# Patient Record
Sex: MALE | Race: WHITE | Employment: FULL TIME | URBAN - METROPOLITAN AREA
[De-identification: names, ages, dates, MRNs, and addresses within clinical notes are randomized per-mention and may not be internally consistent; named-entity substitution may affect disease eponyms.]

---

## 2021-09-01 ENCOUNTER — EKG ENCOUNTER (OUTPATIENT)
Dept: LAB | Age: 36
End: 2021-09-01
Attending: FAMILY MEDICINE
Payer: COMMERCIAL

## 2021-09-01 ENCOUNTER — OFFICE VISIT (OUTPATIENT)
Dept: FAMILY MEDICINE CLINIC | Facility: CLINIC | Age: 36
End: 2021-09-01
Payer: COMMERCIAL

## 2021-09-01 ENCOUNTER — LAB ENCOUNTER (OUTPATIENT)
Dept: LAB | Age: 36
End: 2021-09-01
Attending: FAMILY MEDICINE
Payer: COMMERCIAL

## 2021-09-01 VITALS
HEART RATE: 77 BPM | RESPIRATION RATE: 18 BRPM | HEIGHT: 67 IN | BODY MASS INDEX: 45.1 KG/M2 | WEIGHT: 287.38 LBS | SYSTOLIC BLOOD PRESSURE: 111 MMHG | DIASTOLIC BLOOD PRESSURE: 75 MMHG

## 2021-09-01 DIAGNOSIS — Z00.00 PHYSICAL EXAM: Primary | ICD-10-CM

## 2021-09-01 DIAGNOSIS — Z00.00 PHYSICAL EXAM: ICD-10-CM

## 2021-09-01 DIAGNOSIS — E66.01 CLASS 3 SEVERE OBESITY DUE TO EXCESS CALORIES WITHOUT SERIOUS COMORBIDITY WITH BODY MASS INDEX (BMI) OF 45.0 TO 49.9 IN ADULT (HCC): ICD-10-CM

## 2021-09-01 DIAGNOSIS — F17.200 VAPING NICOTINE DEPENDENCE, NON-TOBACCO PRODUCT: ICD-10-CM

## 2021-09-01 LAB
ALBUMIN SERPL-MCNC: 3.7 G/DL (ref 3.4–5)
ALBUMIN/GLOB SERPL: 0.9 {RATIO} (ref 1–2)
ALP LIVER SERPL-CCNC: 75 U/L
ALT SERPL-CCNC: 60 U/L
ANION GAP SERPL CALC-SCNC: 7 MMOL/L (ref 0–18)
AST SERPL-CCNC: 30 U/L (ref 15–37)
BASOPHILS # BLD AUTO: 0.03 X10(3) UL (ref 0–0.2)
BASOPHILS NFR BLD AUTO: 0.4 %
BILIRUB SERPL-MCNC: 0.3 MG/DL (ref 0.1–2)
BILIRUB UR QL: NEGATIVE
BUN BLD-MCNC: 11 MG/DL (ref 7–18)
BUN/CREAT SERPL: 10.9 (ref 10–20)
CALCIUM BLD-MCNC: 9.2 MG/DL (ref 8.5–10.1)
CHLORIDE SERPL-SCNC: 102 MMOL/L (ref 98–112)
CHOLEST SMN-MCNC: 205 MG/DL (ref ?–200)
CLARITY UR: CLEAR
CO2 SERPL-SCNC: 29 MMOL/L (ref 21–32)
COLOR UR: YELLOW
CREAT BLD-MCNC: 1.01 MG/DL
DEPRECATED RDW RBC AUTO: 41.2 FL (ref 35.1–46.3)
EOSINOPHIL # BLD AUTO: 0.07 X10(3) UL (ref 0–0.7)
EOSINOPHIL NFR BLD AUTO: 0.9 %
ERYTHROCYTE [DISTWIDTH] IN BLOOD BY AUTOMATED COUNT: 13.2 % (ref 11–15)
EST. AVERAGE GLUCOSE BLD GHB EST-MCNC: 114 MG/DL (ref 68–126)
GLOBULIN PLAS-MCNC: 4.2 G/DL (ref 2.8–4.4)
GLUCOSE BLD-MCNC: 85 MG/DL (ref 70–99)
GLUCOSE UR-MCNC: NEGATIVE MG/DL
HBA1C MFR BLD HPLC: 5.6 % (ref ?–5.7)
HCT VFR BLD AUTO: 43.2 %
HDLC SERPL-MCNC: 47 MG/DL (ref 40–59)
HGB BLD-MCNC: 14.2 G/DL
HGB UR QL STRIP.AUTO: NEGATIVE
IMM GRANULOCYTES # BLD AUTO: 0.02 X10(3) UL (ref 0–1)
IMM GRANULOCYTES NFR BLD: 0.3 %
KETONES UR-MCNC: NEGATIVE MG/DL
LDLC SERPL CALC-MCNC: 141 MG/DL (ref ?–100)
LEUKOCYTE ESTERASE UR QL STRIP.AUTO: NEGATIVE
LYMPHOCYTES # BLD AUTO: 1.95 X10(3) UL (ref 1–4)
LYMPHOCYTES NFR BLD AUTO: 26.2 %
M PROTEIN MFR SERPL ELPH: 7.9 G/DL (ref 6.4–8.2)
MCH RBC QN AUTO: 28.5 PG (ref 26–34)
MCHC RBC AUTO-ENTMCNC: 32.9 G/DL (ref 31–37)
MCV RBC AUTO: 86.7 FL
MONOCYTES # BLD AUTO: 0.73 X10(3) UL (ref 0.1–1)
MONOCYTES NFR BLD AUTO: 9.8 %
NEUTROPHILS # BLD AUTO: 4.64 X10 (3) UL (ref 1.5–7.7)
NEUTROPHILS # BLD AUTO: 4.64 X10(3) UL (ref 1.5–7.7)
NEUTROPHILS NFR BLD AUTO: 62.4 %
NITRITE UR QL STRIP.AUTO: NEGATIVE
NONHDLC SERPL-MCNC: 158 MG/DL (ref ?–130)
OSMOLALITY SERPL CALC.SUM OF ELEC: 285 MOSM/KG (ref 275–295)
PATIENT FASTING Y/N/NP: YES
PATIENT FASTING Y/N/NP: YES
PH UR: 7 [PH] (ref 5–8)
PLATELET # BLD AUTO: 237 10(3)UL (ref 150–450)
POTASSIUM SERPL-SCNC: 3.9 MMOL/L (ref 3.5–5.1)
PROT UR-MCNC: NEGATIVE MG/DL
RBC # BLD AUTO: 4.98 X10(6)UL
SODIUM SERPL-SCNC: 138 MMOL/L (ref 136–145)
SP GR UR STRIP: 1.02 (ref 1–1.03)
TRIGL SERPL-MCNC: 94 MG/DL (ref 30–149)
TSI SER-ACNC: 1.18 MIU/ML (ref 0.36–3.74)
UROBILINOGEN UR STRIP-ACNC: <2
VIT D+METAB SERPL-MCNC: 23.9 NG/ML (ref 30–100)
VLDLC SERPL CALC-MCNC: 17 MG/DL (ref 0–30)
WBC # BLD AUTO: 7.4 X10(3) UL (ref 4–11)

## 2021-09-01 PROCEDURE — 81001 URINALYSIS AUTO W/SCOPE: CPT | Performed by: FAMILY MEDICINE

## 2021-09-01 PROCEDURE — 90715 TDAP VACCINE 7 YRS/> IM: CPT | Performed by: FAMILY MEDICINE

## 2021-09-01 PROCEDURE — 99385 PREV VISIT NEW AGE 18-39: CPT | Performed by: FAMILY MEDICINE

## 2021-09-01 PROCEDURE — 93010 ELECTROCARDIOGRAM REPORT: CPT | Performed by: FAMILY MEDICINE

## 2021-09-01 PROCEDURE — 90471 IMMUNIZATION ADMIN: CPT | Performed by: FAMILY MEDICINE

## 2021-09-01 PROCEDURE — 3008F BODY MASS INDEX DOCD: CPT | Performed by: FAMILY MEDICINE

## 2021-09-01 PROCEDURE — 81015 MICROSCOPIC EXAM OF URINE: CPT | Performed by: FAMILY MEDICINE

## 2021-09-01 PROCEDURE — 3078F DIAST BP <80 MM HG: CPT | Performed by: FAMILY MEDICINE

## 2021-09-01 PROCEDURE — 85025 COMPLETE CBC W/AUTO DIFF WBC: CPT

## 2021-09-01 PROCEDURE — 93005 ELECTROCARDIOGRAM TRACING: CPT

## 2021-09-01 PROCEDURE — 3074F SYST BP LT 130 MM HG: CPT | Performed by: FAMILY MEDICINE

## 2021-09-01 PROCEDURE — 84443 ASSAY THYROID STIM HORMONE: CPT

## 2021-09-01 PROCEDURE — 82306 VITAMIN D 25 HYDROXY: CPT | Performed by: FAMILY MEDICINE

## 2021-09-01 PROCEDURE — 80053 COMPREHEN METABOLIC PANEL: CPT

## 2021-09-01 PROCEDURE — 80061 LIPID PANEL: CPT

## 2021-09-01 PROCEDURE — 36415 COLL VENOUS BLD VENIPUNCTURE: CPT

## 2021-09-01 PROCEDURE — 83036 HEMOGLOBIN GLYCOSYLATED A1C: CPT

## 2021-09-01 RX ORDER — ERGOCALCIFEROL 1.25 MG/1
50000 CAPSULE ORAL WEEKLY
Qty: 12 CAPSULE | Refills: 4 | Status: SHIPPED | OUTPATIENT
Start: 2021-09-01 | End: 2021-10-01

## 2021-09-01 NOTE — PROGRESS NOTES
9/1/2021  8:51 AM    Shyam Bray is a 43year old male. Chief complaint(s): Patient presents with:  Routine Physical: Px.     HPI:     Shyam Bray primary complaint is regarding CPE.      Shyam Bray is a 43year old male is here for routine per vomiting. Endocrine: Negative for polydipsia and polyuria. Genitourinary: Negative for decreased urine volume, frequency and hematuria. No nocturia   Musculoskeletal: Negative for arthralgias. Skin: Negative for rash.    Neurological: Negative is alert. Deep Tendon Reflexes:      Reflex Scores:       Patellar reflexes are 2+ on the right side and 2+ on the left side.   Psychiatric:         Attention and Perception: Attention normal.         Mood and Affect: Mood and affect normal.         LA active; attempt to keep a schedule that includes an adequate sleep and physical exercise / activities Patient educated on doing regular self testicular exam. Patient was educated on sexual transmitted disease.  Best to abstain from sexual intercourse until

## 2021-11-11 ENCOUNTER — TELEMEDICINE (OUTPATIENT)
Dept: TELEHEALTH | Age: 36
End: 2021-11-11

## 2021-11-11 VITALS — HEIGHT: 67 IN | WEIGHT: 287 LBS | BODY MASS INDEX: 45.04 KG/M2

## 2021-11-11 DIAGNOSIS — J06.9 UPPER RESPIRATORY TRACT INFECTION, UNSPECIFIED TYPE: Primary | ICD-10-CM

## 2021-11-11 PROCEDURE — 3008F BODY MASS INDEX DOCD: CPT | Performed by: NURSE PRACTITIONER

## 2021-11-11 PROCEDURE — 99202 OFFICE O/P NEW SF 15 MIN: CPT | Performed by: NURSE PRACTITIONER

## 2021-11-11 RX ORDER — ERGOCALCIFEROL 1.25 MG/1
CAPSULE ORAL
COMMUNITY

## 2021-11-11 NOTE — PROGRESS NOTES
Virtual/Telephone Check-In    Baldev Puente verbally consents to a Virtual/Telephone Check-In service on 11/11/21. Patient has been referred to the [facility] website at [email or web address] to review the yearly Consent to Treat document.   Patient Lynn Dasilva lymphadenopathy  NEURO: see hpi      EXAM:   Ht 5' 7\" (1.702 m)   Wt 287 lb (130.2 kg)   BMI 44.95 kg/m²   GENERAL: well developed, well nourished,in no apparent distress  SKIN: no rashes,  HEAD: atraumatic, normocephalic.  No sinus pain with patient palpi illness (URI), which is another term for the common cold. This illness is contagious during the first few days. It is spread through the air by coughing and sneezing.  It may also be spread by direct contact (touching the sick person and then touching your your healthcare provider, or as advised.   When to seek medical advice  Call your healthcare provider right away if any of these occur:  · Cough with lots of colored sputum (mucus)  · Severe headache; face, neck, or ear pain  · Difficulty swallowing due to consent form, related consents and the risks discussed. Lastly, the patient confirmed that they were in [place]. Included in this visit, time may have been spent reviewing labs, medications, radiology tests and decision making.  Appropriate medical de

## 2023-10-20 ENCOUNTER — TELEMEDICINE (OUTPATIENT)
Dept: TELEHEALTH | Age: 38
End: 2023-10-20
Payer: COMMERCIAL

## 2023-10-20 ENCOUNTER — HOSPITAL ENCOUNTER (OUTPATIENT)
Age: 38
Discharge: HOME | End: 2023-10-20
Payer: COMMERCIAL

## 2023-10-20 VITALS
RESPIRATION RATE: 18 BRPM | DIASTOLIC BLOOD PRESSURE: 70 MMHG | HEART RATE: 97 BPM | TEMPERATURE: 99 F | SYSTOLIC BLOOD PRESSURE: 123 MMHG | OXYGEN SATURATION: 98 %

## 2023-10-20 DIAGNOSIS — J06.9 VIRAL UPPER RESPIRATORY TRACT INFECTION: Primary | ICD-10-CM

## 2023-10-20 DIAGNOSIS — R05.1 ACUTE COUGH: Primary | ICD-10-CM

## 2023-10-20 DIAGNOSIS — Z20.822 LAB TEST NEGATIVE FOR COVID-19 VIRUS: ICD-10-CM

## 2023-10-20 DIAGNOSIS — R07.89 FEELING OF CHEST TIGHTNESS: ICD-10-CM

## 2023-10-20 LAB
S PYO AG THROAT QL: NEGATIVE
SARS-COV-2 RNA RESP QL NAA+PROBE: NOT DETECTED

## 2023-10-20 PROCEDURE — 99214 OFFICE O/P EST MOD 30 MIN: CPT | Performed by: NURSE PRACTITIONER

## 2023-10-20 RX ORDER — BENZONATATE 100 MG/1
100 CAPSULE ORAL 3 TIMES DAILY PRN
Qty: 20 CAPSULE | Refills: 0 | Status: SHIPPED | OUTPATIENT
Start: 2023-10-20

## 2023-10-20 RX ORDER — CETIRIZINE HYDROCHLORIDE 10 MG/1
10 TABLET ORAL DAILY
Qty: 30 TABLET | Refills: 0 | Status: SHIPPED | OUTPATIENT
Start: 2023-10-20

## 2023-10-20 RX ORDER — FLUTICASONE PROPIONATE 50 MCG
2 SPRAY, SUSPENSION (ML) NASAL DAILY
Qty: 16 G | Refills: 0 | Status: SHIPPED | OUTPATIENT
Start: 2023-10-20

## 2023-10-20 NOTE — PROGRESS NOTES
Virtual/Telephone Check-In    Fredy Patricia verbally consents to a Virtual/Telephone Check-In service on 10/20/23. Patient has been referred to the [facility] website at [email or web address] to review the yearly Consent to Treat document. Patient understands and accepts financial responsibility for any deductible, co-insurance and/or co-pays associated with this service. Telehealth Verbal Consent   I conducted a telehealth visit with Fredy Patricia today, 10/20/23, which was completed using two-way, real-time interactive audio and video communication. This has been done in good carmen to provide continuity of care in the best interest of the provider-patient relationship. Every conscious effort was taken to allow for sufficient and adequate time to complete the visit. The patient was made aware of the limitations of the telehealth visit, including treatment limitations as physical exam through video visit is limited. The patient was advised to call 911 or to go to the ER in case there was an emergency. The patient was also advised of the potential privacy & security concerns related to the telehealth platform. The patient was made aware of where to find [facility] notice of privacy practices, telehealth consent form and other related consent forms and documents. which are located on the [facility] website. The patient verbally agreed to telehealth consent form, related consents and the risks discussed. Lastly, the patient confirmed that they were in [place]. Included in this visit, time may have been spent reviewing labs, medications, radiology tests and decision making. Appropriate medical decision-making and tests are ordered as detailed in the plan of care above. Coding/billing information is submitted for this visit based on complexity of care and/or time spent for the visit.     CHIEF COMPLAINT:   Patient presents with:  Upper Respiratory Infection      HPI:   Fredy Patricia is a 34year old male who presents for a video visit. Patient reports ill symptoms starting 2 days ago. Reports itchy throat, hoarse voice, runny nose, cough, body aches, ear pressure/clogged(left at first, now right). Has felt feverish today but has not checked temperature. Also reports feeling tight pressure/heaviness in the lungs, slightly short of breath with activity. Denies hx of asthma but states used family members inhaler which helped somewhat. Patient has tried theraflu tea, otc cough and cold medication for symptoms, which has somewhat seemed to help. Current Outpatient Medications   Medication Sig Dispense Refill    ergocalciferol 1.25 MG (48415 UT) Oral Cap Take by mouth every 7 days. No past medical history on file. No past surgical history on file. Social History     Socioeconomic History    Marital status:    Tobacco Use    Smoking status: Former    Smokeless tobacco: Never   Vaping Use    Vaping Use: Some days    Substances: Nicotine   Substance and Sexual Activity    Alcohol use: Yes     Comment: Socially    Drug use: Never         REVIEW OF SYSTEMS:   GENERAL: decreased appetite. + body aches  SKIN: no rashes or abnormal skin lesions  HEENT: See HPI  LUNGS:  See HPI  CARDIOVASCULAR: denies chest pain or palpitations   GI: denies N/V/C or abdominal pain  NEURO: Denies headaches    EXAM:   General: Alert, Ill-appearing/sounding, and In no acute distress  Respiratory:   Speaking in full sentences comfortably  Normal work of breathing  Coughing during visit   Head: Normocephalic  Nose: No obvious nasal discharge. Skin: No obvious rashes or lesions from what observed. No results found for this or any previous visit (from the past 24 hour(s)).     ASSESSMENT AND PLAN:   Ramses Awan is a 34year old male who presents with symptoms that are consistent with    ASSESSMENT:   Acute cough  (primary encounter diagnosis)  Feeling of chest tightness    PLAN: Discussed possible viral etiology such as COVID or influenza however due to reported chest tightness, possible wheezing, recommended in person evaluation for more thorough examination. Patient states will go to [facility] care. Spoke with Keerthi Hammond to give report.      Meds & Refills for this Visit:  Requested Prescriptions      No prescriptions requested or ordered in this encounter

## 2023-10-20 NOTE — DISCHARGE INSTRUCTIONS
Start the medications as prescribed to help with symptoms. Take over-the-counter ibuprofen or Tylenol for pain. Follow-up with your primary care provider 2 to 3 days. If you develop high fever chest pain shortness of breath nausea vomiting diarrhea or productive cough or any new or worsening symptoms go to the nearest emergency department.

## 2024-02-15 ENCOUNTER — APPOINTMENT (OUTPATIENT)
Dept: CT IMAGING | Facility: HOSPITAL | Age: 39
End: 2024-02-15
Attending: STUDENT IN AN ORGANIZED HEALTH CARE EDUCATION/TRAINING PROGRAM

## 2024-02-15 ENCOUNTER — HOSPITAL ENCOUNTER (INPATIENT)
Facility: HOSPITAL | Age: 39
LOS: 2 days | Discharge: HOME | End: 2024-02-17
Attending: STUDENT IN AN ORGANIZED HEALTH CARE EDUCATION/TRAINING PROGRAM | Admitting: HOSPITALIST

## 2024-02-15 DIAGNOSIS — K29.80 DUODENITIS: Primary | ICD-10-CM

## 2024-02-15 LAB
ALBUMIN SERPL-MCNC: 4.3 G/DL (ref 3.2–4.8)
ALP LIVER SERPL-CCNC: 75 U/L
ALT SERPL-CCNC: 45 U/L
ANION GAP SERPL CALC-SCNC: 4 MMOL/L (ref 0–18)
AST SERPL-CCNC: 28 U/L (ref ?–34)
BASOPHILS # BLD AUTO: 0.03 X10(3) UL (ref 0–0.2)
BASOPHILS NFR BLD AUTO: 0.2 %
BILIRUB DIRECT SERPL-MCNC: 0.2 MG/DL (ref ?–0.3)
BILIRUB SERPL-MCNC: 0.6 MG/DL (ref 0.3–1.2)
BILIRUB UR QL: NEGATIVE
BUN BLD-MCNC: 7 MG/DL (ref 9–23)
BUN/CREAT SERPL: 7.9 (ref 10–20)
CALCIUM BLD-MCNC: 9.2 MG/DL (ref 8.7–10.4)
CHLORIDE SERPL-SCNC: 103 MMOL/L (ref 98–112)
CLARITY UR: CLEAR
CO2 SERPL-SCNC: 28 MMOL/L (ref 21–32)
CREAT BLD-MCNC: 0.89 MG/DL
DEPRECATED RDW RBC AUTO: 40.7 FL (ref 35.1–46.3)
EGFRCR SERPLBLD CKD-EPI 2021: 111 ML/MIN/1.73M2 (ref 60–?)
EOSINOPHIL # BLD AUTO: 0.04 X10(3) UL (ref 0–0.7)
EOSINOPHIL NFR BLD AUTO: 0.3 %
ERYTHROCYTE [DISTWIDTH] IN BLOOD BY AUTOMATED COUNT: 13.3 % (ref 11–15)
GLUCOSE BLD-MCNC: 97 MG/DL (ref 70–99)
GLUCOSE UR-MCNC: NORMAL MG/DL
HCT VFR BLD AUTO: 43.2 %
HGB BLD-MCNC: 14.4 G/DL
HGB UR QL STRIP.AUTO: NEGATIVE
IMM GRANULOCYTES # BLD AUTO: 0.05 X10(3) UL (ref 0–1)
IMM GRANULOCYTES NFR BLD: 0.3 %
KETONES UR-MCNC: NEGATIVE MG/DL
LEUKOCYTE ESTERASE UR QL STRIP.AUTO: NEGATIVE
LIPASE SERPL-CCNC: 35 U/L (ref 13–75)
LYMPHOCYTES # BLD AUTO: 1.8 X10(3) UL (ref 1–4)
LYMPHOCYTES NFR BLD AUTO: 12 %
MCH RBC QN AUTO: 27.7 PG (ref 26–34)
MCHC RBC AUTO-ENTMCNC: 33.3 G/DL (ref 31–37)
MCV RBC AUTO: 83.2 FL
MONOCYTES # BLD AUTO: 1.27 X10(3) UL (ref 0.1–1)
MONOCYTES NFR BLD AUTO: 8.5 %
NEUTROPHILS # BLD AUTO: 11.79 X10 (3) UL (ref 1.5–7.7)
NEUTROPHILS # BLD AUTO: 11.79 X10(3) UL (ref 1.5–7.7)
NEUTROPHILS NFR BLD AUTO: 78.7 %
NITRITE UR QL STRIP.AUTO: NEGATIVE
OSMOLALITY SERPL CALC.SUM OF ELEC: 278 MOSM/KG (ref 275–295)
PH UR: 6.5 [PH] (ref 5–8)
PLATELET # BLD AUTO: 251 10(3)UL (ref 150–450)
POTASSIUM SERPL-SCNC: 3.8 MMOL/L (ref 3.5–5.1)
PROT SERPL-MCNC: 7.7 G/DL (ref 5.7–8.2)
PROT UR-MCNC: NEGATIVE MG/DL
RBC # BLD AUTO: 5.19 X10(6)UL
SODIUM SERPL-SCNC: 135 MMOL/L (ref 136–145)
SP GR UR STRIP: 1.01 (ref 1–1.03)
UROBILINOGEN UR STRIP-ACNC: NORMAL
WBC # BLD AUTO: 15 X10(3) UL (ref 4–11)

## 2024-02-15 PROCEDURE — 99222 1ST HOSP IP/OBS MODERATE 55: CPT | Performed by: HOSPITALIST

## 2024-02-15 PROCEDURE — 74177 CT ABD & PELVIS W/CONTRAST: CPT | Performed by: STUDENT IN AN ORGANIZED HEALTH CARE EDUCATION/TRAINING PROGRAM

## 2024-02-15 RX ORDER — ONDANSETRON 2 MG/ML
4 INJECTION INTRAMUSCULAR; INTRAVENOUS ONCE
Status: COMPLETED | OUTPATIENT
Start: 2024-02-15 | End: 2024-02-15

## 2024-02-15 RX ORDER — MORPHINE SULFATE 4 MG/ML
4 INJECTION, SOLUTION INTRAMUSCULAR; INTRAVENOUS ONCE
Status: COMPLETED | OUTPATIENT
Start: 2024-02-15 | End: 2024-02-15

## 2024-02-15 RX ORDER — MORPHINE SULFATE 2 MG/ML
1 INJECTION, SOLUTION INTRAMUSCULAR; INTRAVENOUS EVERY 2 HOUR PRN
Status: DISCONTINUED | OUTPATIENT
Start: 2024-02-15 | End: 2024-02-17

## 2024-02-15 RX ORDER — FAMOTIDINE 10 MG/ML
20 INJECTION, SOLUTION INTRAVENOUS ONCE
Status: COMPLETED | OUTPATIENT
Start: 2024-02-15 | End: 2024-02-15

## 2024-02-15 RX ORDER — DEXTROSE AND SODIUM CHLORIDE 5; .9 G/100ML; G/100ML
INJECTION, SOLUTION INTRAVENOUS CONTINUOUS
Status: DISCONTINUED | OUTPATIENT
Start: 2024-02-15 | End: 2024-02-17

## 2024-02-15 RX ORDER — PROCHLORPERAZINE EDISYLATE 5 MG/ML
5 INJECTION INTRAMUSCULAR; INTRAVENOUS EVERY 8 HOURS PRN
Status: DISCONTINUED | OUTPATIENT
Start: 2024-02-15 | End: 2024-02-17

## 2024-02-15 RX ORDER — MORPHINE SULFATE 2 MG/ML
2 INJECTION, SOLUTION INTRAMUSCULAR; INTRAVENOUS EVERY 2 HOUR PRN
Status: DISCONTINUED | OUTPATIENT
Start: 2024-02-15 | End: 2024-02-17

## 2024-02-15 RX ORDER — ONDANSETRON 2 MG/ML
4 INJECTION INTRAMUSCULAR; INTRAVENOUS EVERY 6 HOURS PRN
Status: DISCONTINUED | OUTPATIENT
Start: 2024-02-15 | End: 2024-02-17

## 2024-02-15 RX ORDER — METOCLOPRAMIDE HYDROCHLORIDE 5 MG/ML
10 INJECTION INTRAMUSCULAR; INTRAVENOUS ONCE
Status: COMPLETED | OUTPATIENT
Start: 2024-02-15 | End: 2024-02-15

## 2024-02-15 RX ORDER — ACETAMINOPHEN 500 MG
500 TABLET ORAL EVERY 4 HOURS PRN
Status: DISCONTINUED | OUTPATIENT
Start: 2024-02-15 | End: 2024-02-17

## 2024-02-15 RX ORDER — TEMAZEPAM 15 MG/1
15 CAPSULE ORAL NIGHTLY PRN
Status: DISCONTINUED | OUTPATIENT
Start: 2024-02-15 | End: 2024-02-17

## 2024-02-15 RX ORDER — MORPHINE SULFATE 4 MG/ML
4 INJECTION, SOLUTION INTRAMUSCULAR; INTRAVENOUS EVERY 2 HOUR PRN
Status: DISCONTINUED | OUTPATIENT
Start: 2024-02-15 | End: 2024-02-17

## 2024-02-15 NOTE — ED INITIAL ASSESSMENT (HPI)
Pt to ED with c/o atraumatic generalized abdominal pain for about 3 days. Pt denies bloody or black stools. Denies diarrhea. +constipation. Denies dysuria or hematuria. Pt states intermittent nausea and vomiting. Denies cough. No noted fever at home. Pt is alert and oriented x4. Pt skin warm and dry. Pt ambulating by self with steady gait.

## 2024-02-16 ENCOUNTER — ANESTHESIA (OUTPATIENT)
Dept: ENDOSCOPY | Facility: HOSPITAL | Age: 39
End: 2024-02-16

## 2024-02-16 ENCOUNTER — ANESTHESIA EVENT (OUTPATIENT)
Dept: ENDOSCOPY | Facility: HOSPITAL | Age: 39
End: 2024-02-16

## 2024-02-16 LAB
ANION GAP SERPL CALC-SCNC: 4 MMOL/L (ref 0–18)
BASOPHILS # BLD AUTO: 0.03 X10(3) UL (ref 0–0.2)
BASOPHILS NFR BLD AUTO: 0.2 %
BUN BLD-MCNC: 6 MG/DL (ref 9–23)
BUN/CREAT SERPL: 7.3 (ref 10–20)
CALCIUM BLD-MCNC: 8.5 MG/DL (ref 8.7–10.4)
CHLORIDE SERPL-SCNC: 105 MMOL/L (ref 98–112)
CO2 SERPL-SCNC: 29 MMOL/L (ref 21–32)
CREAT BLD-MCNC: 0.82 MG/DL
DEPRECATED RDW RBC AUTO: 42.2 FL (ref 35.1–46.3)
EGFRCR SERPLBLD CKD-EPI 2021: 114 ML/MIN/1.73M2 (ref 60–?)
EOSINOPHIL # BLD AUTO: 0.06 X10(3) UL (ref 0–0.7)
EOSINOPHIL NFR BLD AUTO: 0.4 %
ERYTHROCYTE [DISTWIDTH] IN BLOOD BY AUTOMATED COUNT: 13.6 % (ref 11–15)
GLUCOSE BLD-MCNC: 113 MG/DL (ref 70–99)
HCT VFR BLD AUTO: 39.3 %
HGB BLD-MCNC: 12.8 G/DL
IMM GRANULOCYTES # BLD AUTO: 0.04 X10(3) UL (ref 0–1)
IMM GRANULOCYTES NFR BLD: 0.3 %
LYMPHOCYTES # BLD AUTO: 1.85 X10(3) UL (ref 1–4)
LYMPHOCYTES NFR BLD AUTO: 13.3 %
MCH RBC QN AUTO: 27.7 PG (ref 26–34)
MCHC RBC AUTO-ENTMCNC: 32.6 G/DL (ref 31–37)
MCV RBC AUTO: 85.1 FL
MONOCYTES # BLD AUTO: 1.85 X10(3) UL (ref 0.1–1)
MONOCYTES NFR BLD AUTO: 13.3 %
NEUTROPHILS # BLD AUTO: 10.13 X10 (3) UL (ref 1.5–7.7)
NEUTROPHILS # BLD AUTO: 10.13 X10(3) UL (ref 1.5–7.7)
NEUTROPHILS NFR BLD AUTO: 72.5 %
OSMOLALITY SERPL CALC.SUM OF ELEC: 284 MOSM/KG (ref 275–295)
PLATELET # BLD AUTO: 213 10(3)UL (ref 150–450)
POTASSIUM SERPL-SCNC: 3.8 MMOL/L (ref 3.5–5.1)
RBC # BLD AUTO: 4.62 X10(6)UL
SODIUM SERPL-SCNC: 138 MMOL/L (ref 136–145)
TRIGL SERPL-MCNC: 86 MG/DL (ref 30–149)
WBC # BLD AUTO: 14 X10(3) UL (ref 4–11)

## 2024-02-16 PROCEDURE — 99223 1ST HOSP IP/OBS HIGH 75: CPT | Performed by: INTERNAL MEDICINE

## 2024-02-16 PROCEDURE — 99233 SBSQ HOSP IP/OBS HIGH 50: CPT | Performed by: HOSPITALIST

## 2024-02-16 PROCEDURE — 43239 EGD BIOPSY SINGLE/MULTIPLE: CPT | Performed by: INTERNAL MEDICINE

## 2024-02-16 PROCEDURE — 0DB68ZX EXCISION OF STOMACH, VIA NATURAL OR ARTIFICIAL OPENING ENDOSCOPIC, DIAGNOSTIC: ICD-10-PCS | Performed by: INTERNAL MEDICINE

## 2024-02-16 RX ORDER — HYDROCODONE BITARTRATE AND ACETAMINOPHEN 5; 325 MG/1; MG/1
1 TABLET ORAL EVERY 6 HOURS PRN
Status: DISCONTINUED | OUTPATIENT
Start: 2024-02-16 | End: 2024-02-17

## 2024-02-16 RX ORDER — HYDROMORPHONE HYDROCHLORIDE 1 MG/ML
0.6 INJECTION, SOLUTION INTRAMUSCULAR; INTRAVENOUS; SUBCUTANEOUS EVERY 5 MIN PRN
Status: DISCONTINUED | OUTPATIENT
Start: 2024-02-16 | End: 2024-02-16 | Stop reason: HOSPADM

## 2024-02-16 RX ORDER — NALOXONE HYDROCHLORIDE 0.4 MG/ML
80 INJECTION, SOLUTION INTRAMUSCULAR; INTRAVENOUS; SUBCUTANEOUS AS NEEDED
Status: DISCONTINUED | OUTPATIENT
Start: 2024-02-16 | End: 2024-02-16 | Stop reason: HOSPADM

## 2024-02-16 RX ORDER — SODIUM CHLORIDE, SODIUM LACTATE, POTASSIUM CHLORIDE, CALCIUM CHLORIDE 600; 310; 30; 20 MG/100ML; MG/100ML; MG/100ML; MG/100ML
INJECTION, SOLUTION INTRAVENOUS CONTINUOUS PRN
Status: DISCONTINUED | OUTPATIENT
Start: 2024-02-16 | End: 2024-02-16 | Stop reason: SURG

## 2024-02-16 RX ORDER — HYDROMORPHONE HYDROCHLORIDE 1 MG/ML
0.2 INJECTION, SOLUTION INTRAMUSCULAR; INTRAVENOUS; SUBCUTANEOUS EVERY 5 MIN PRN
Status: DISCONTINUED | OUTPATIENT
Start: 2024-02-16 | End: 2024-02-16 | Stop reason: HOSPADM

## 2024-02-16 RX ORDER — SODIUM CHLORIDE, SODIUM LACTATE, POTASSIUM CHLORIDE, CALCIUM CHLORIDE 600; 310; 30; 20 MG/100ML; MG/100ML; MG/100ML; MG/100ML
INJECTION, SOLUTION INTRAVENOUS CONTINUOUS
Status: DISCONTINUED | OUTPATIENT
Start: 2024-02-16 | End: 2024-02-16

## 2024-02-16 RX ORDER — MORPHINE SULFATE 4 MG/ML
4 INJECTION, SOLUTION INTRAMUSCULAR; INTRAVENOUS EVERY 10 MIN PRN
Status: DISCONTINUED | OUTPATIENT
Start: 2024-02-16 | End: 2024-02-16 | Stop reason: HOSPADM

## 2024-02-16 RX ORDER — POLYETHYLENE GLYCOL 3350 17 G/17G
17 POWDER, FOR SOLUTION ORAL 2 TIMES DAILY
Status: DISCONTINUED | OUTPATIENT
Start: 2024-02-16 | End: 2024-02-17

## 2024-02-16 RX ORDER — MORPHINE SULFATE 10 MG/ML
6 INJECTION, SOLUTION INTRAMUSCULAR; INTRAVENOUS EVERY 10 MIN PRN
Status: DISCONTINUED | OUTPATIENT
Start: 2024-02-16 | End: 2024-02-16 | Stop reason: HOSPADM

## 2024-02-16 RX ORDER — MORPHINE SULFATE 4 MG/ML
2 INJECTION, SOLUTION INTRAMUSCULAR; INTRAVENOUS EVERY 10 MIN PRN
Status: DISCONTINUED | OUTPATIENT
Start: 2024-02-16 | End: 2024-02-16 | Stop reason: HOSPADM

## 2024-02-16 RX ORDER — HYDROMORPHONE HYDROCHLORIDE 1 MG/ML
0.4 INJECTION, SOLUTION INTRAMUSCULAR; INTRAVENOUS; SUBCUTANEOUS EVERY 5 MIN PRN
Status: DISCONTINUED | OUTPATIENT
Start: 2024-02-16 | End: 2024-02-16 | Stop reason: HOSPADM

## 2024-02-16 RX ADMIN — SODIUM CHLORIDE, SODIUM LACTATE, POTASSIUM CHLORIDE, CALCIUM CHLORIDE: 600; 310; 30; 20 INJECTION, SOLUTION INTRAVENOUS at 13:31:00

## 2024-02-16 NOTE — PLAN OF CARE
Patient is Aox4, vital signs stable, room air, independent. EGD done. Clear liquid diet, tolerating well. Patient complaints of abdominal pain. Morphine given in the morning with some relief. After the EGD patient refused pain medication. Per patient would like to see if he can tolerate pain without pain medication. Educated patient on pain medication and importance of controlling the pain. Call light within reach.     Problem: GASTROINTESTINAL - ADULT  Goal: Minimal or absence of nausea and vomiting  Description: INTERVENTIONS:  - Maintain adequate hydration with IV or PO as ordered and tolerated  - Nasogastric tube to low intermittent suction as ordered  - Evaluate effectiveness of ordered antiemetic medications  - Provide nonpharmacologic comfort measures as appropriate  - Advance diet as tolerated, if ordered  - Obtain nutritional consult as needed  - Evaluate fluid balance  Outcome: Progressing     Problem: PAIN - ADULT  Goal: Verbalizes/displays adequate comfort level or patient's stated pain goal  Description: INTERVENTIONS:  - Encourage pt to monitor pain and request assistance  - Assess pain using appropriate pain scale  - Administer analgesics based on type and severity of pain and evaluate response  - Implement non-pharmacological measures as appropriate and evaluate response  - Consider cultural and social influences on pain and pain management  - Manage/alleviate anxiety  - Utilize distraction and/or relaxation techniques  - Monitor for opioid side effects  - Notify MD/LIP if interventions unsuccessful or patient reports new pain  - Anticipate increased pain with activity and pre-medicate as appropriate  2/16/2024 1011 by Portia Berrios, RN  Outcome: Not Progressing  2/16/2024 1010 by Portia Berrios, RN  Outcome: Not Progressing

## 2024-02-16 NOTE — ANESTHESIA PREPROCEDURE EVALUATION
Anesthesia PreOp Note    HPI:     Mario Doran is a 38 year old male who presents for preoperative consultation requested by: Sydnee Bedoya MD    Date of Surgery: 2/15/2024 - 2/16/2024    Procedure(s):  ESOPHAGOGASTRODUODENOSCOPY (EGD)  Indication: epigastric pain    Relevant Problems   No relevant active problems       NPO:  Last Liquid Consumption Date: 02/16/24  Last Liquid Consumption Time: 1200  Last Solid Consumption Date: 02/15/24  Last Solid Consumption Time: 1800  Last Liquid Consumption Date: 02/16/24          History Review:  Patient Active Problem List    Diagnosis Date Noted    Duodenitis 02/15/2024       Past Medical History:   Diagnosis Date    Asthma        History reviewed. No pertinent surgical history.    Medications Prior to Admission   Medication Sig Dispense Refill Last Dose    fluticasone propionate 50 MCG/ACT Nasal Suspension 2 sprays by Nasal route daily. (Patient taking differently: 2 sprays by Nasal route daily as needed.) 16 g 0 Past Month    cetirizine 10 MG Oral Tab Take 1 tablet (10 mg total) by mouth daily. (Patient taking differently: Take 1 tablet (10 mg total) by mouth daily as needed for Allergies.) 30 tablet 0 Past Month    ergocalciferol 1.25 MG (35898 UT) Oral Cap Take by mouth every 7 days.   Past Month     Current Facility-Administered Medications Ordered in Epic   Medication Dose Route Frequency Provider Last Rate Last Admin    pantoprazole (Protonix) 40 mg in sodium chloride 0.9% PF 10 mL IV push  40 mg Intravenous Q12H Sydnee Bedoya MD        dextrose 5%-sodium chloride 0.9% infusion   Intravenous Continuous Karan Rodriguez  mL/hr at 02/16/24 0614 New Bag at 02/16/24 0614    acetaminophen (Tylenol Extra Strength) tab 500 mg  500 mg Oral Q4H PRN Kraan Rodriguez MD        morphINE PF 2 MG/ML injection 1 mg  1 mg Intravenous Q2H PRN Karan Rodriguez MD        Or    morphINE PF 2 MG/ML injection 2 mg  2 mg Intravenous Q2H PRN Karan Rodriguez MD   2 mg at  02/16/24 0822    Or    morphINE PF 4 MG/ML injection 4 mg  4 mg Intravenous Q2H PRN Karan Rodriguez MD   4 mg at 02/16/24 1029    ondansetron (Zofran) 4 MG/2ML injection 4 mg  4 mg Intravenous Q6H PRN Karan Rodriguez MD        prochlorperazine (Compazine) 10 MG/2ML injection 5 mg  5 mg Intravenous Q8H PRN Karan Rodriguez MD        temazepam (Restoril) cap 15 mg  15 mg Oral Nightly PRN Karan Rodriguez MD         No current Epic-ordered outpatient medications on file.       Allergies   Allergen Reactions    Seasonal OTHER (SEE COMMENTS)     Sneezing, watery eyes      Avocado ITCHING    Bananas ITCHING       Family History   Problem Relation Age of Onset    Asthma Mother     Asthma Maternal Grandmother      Social History     Socioeconomic History    Marital status:    Tobacco Use    Smoking status: Former    Smokeless tobacco: Never   Vaping Use    Vaping Use: Some days    Substances: Nicotine   Substance and Sexual Activity    Alcohol use: Yes     Comment: Socially    Drug use: Never       Available pre-op labs reviewed.  Lab Results   Component Value Date    WBC 14.0 (H) 02/16/2024    RBC 4.62 02/16/2024    HGB 12.8 (L) 02/16/2024    HCT 39.3 02/16/2024    MCV 85.1 02/16/2024    MCH 27.7 02/16/2024    MCHC 32.6 02/16/2024    RDW 13.6 02/16/2024    .0 02/16/2024     Lab Results   Component Value Date     02/16/2024    K 3.8 02/16/2024     02/16/2024    CO2 29.0 02/16/2024    BUN 6 (L) 02/16/2024    CREATSERUM 0.82 02/16/2024     (H) 02/16/2024    CA 8.5 (L) 02/16/2024          Vital Signs:  Body mass index is 45.61 kg/m².   height is 1.702 m (5' 7\") and weight is 132.1 kg (291 lb 3.2 oz). His oral temperature is 98.1 °F (36.7 °C). His blood pressure is 130/71 and his pulse is 86. His respiration is 22 and oxygen saturation is 96%.   Vitals:    02/15/24 2040 02/16/24 0609 02/16/24 0821 02/16/24 1328   BP: 132/74 130/74 131/77 130/71   Pulse: 78 79 83 86   Resp: 18 16 16 22   Temp:   98.6 °F (37 °C) 98.1 °F (36.7 °C)    TempSrc:  Oral Oral    SpO2: 98% 96% 96% 96%   Weight: 132.1 kg (291 lb 3.2 oz)      Height:            Anesthesia Evaluation     Patient summary reviewed and Nursing notes reviewed    Airway   Mallampati: II  TM distance: >3 FB  Neck ROM: full  Dental          Pulmonary - normal exam   (+) asthma  Cardiovascular - normal exam  Exercise tolerance: good    NYHA Classification: I    Neuro/Psych      GI/Hepatic/Renal - negative ROS     Endo/Other - negative ROS   Abdominal  - normal exam     Other findings: Bad teeth            Anesthesia Plan:   ASA:  2  Plan:   MAC  Informed Consent Plan and Risks Discussed With:  Patient  Discussed plan with:  Attending and surgeon  Provider Attestation (if preop done by other):  MAC      I have informed Mario Doran and/or legal guardian or family member of the nature of the anesthetic plan, benefits, risks including possible dental damage if relevant, major complications, and any alternative forms of anesthetic management.   All of the patient's questions were answered to the best of my ability. The patient desires the anesthetic management as planned.  PRESTON ABEL MD  2/16/2024 1:34 PM  Present on Admission:  **None**

## 2024-02-16 NOTE — ED QUICK NOTES
Orders for admission, patient is aware of plan and ready to go upstairs. Any questions, please call ED RN Vandana at extension [phone].     Patient Covid vaccination status: Fully vaccinated     COVID Test Ordered in ED: None    COVID Suspicion at Admission: N/A    Running Infusions:      Mental Status/LOC at time of transport: A&Ox4    Other pertinent information:   CIWA score: N/A   NIH score:  N/A

## 2024-02-16 NOTE — ED PROVIDER NOTES
[facility] Emergency Department Note  Patient: Mario Doran Age: 38 year old Sex: male      MRN: [id number]  : 1985    Patient Seen in: [facility] Emergency Department    History     Chief Complaint   Patient presents with    Abdomen/Flank Pain     Stated Complaint: abd pain    History obtained from: patient    37 yo M with no PMHx came in with abdominal pain x3 days.  Describes severe generalized abdominal pain has been constant over the past 3 days.  Endorses intermittent nausea and vomiting.  States his last bowel movement was 3 days ago.  Denies any constipation.  Denies any fevers.  States he has been taking over-the-counter stool softeners with no improvement of symptoms.    Review of Systems:  Review of Systems  Positive for stated complaint: abd pain. Constitutional and vital signs reviewed. All other systems reviewed and negative except as noted above.    Patient History:  History reviewed. No pertinent past medical history.    History reviewed. No pertinent surgical history.     Family History   Problem Relation Age of Onset    Asthma Mother     Asthma Maternal Grandmother        Specific Social Determinants of Health:   Social History     Socioeconomic History    Marital status:    Tobacco Use    Smoking status: Former    Smokeless tobacco: Never   Vaping Use    Vaping Use: Some days    Substances: Nicotine   Substance and Sexual Activity    Alcohol use: Yes     Comment: Socially    Drug use: Never     Social Determinants of Health     Food Insecurity: No Food Insecurity (2/15/2024)    Food Insecurity     Food Insecurity: Never true   Transportation Needs: No Transportation Needs (2/15/2024)    Transportation Needs     Lack of Transportation: No   Housing Stability: Low Risk  (2/15/2024)    Housing Stability     Housing Instability: No           PSFH elements reviewed from today and agreed except as otherwise stated in HPI.    Physical Exam     ED Triage Vitals [02/15/24 1321]   BP  129/74   Pulse 64   Resp 18   Temp 99.8 °F (37.7 °C)   Temp src Temporal   SpO2 97 %   O2 Device None (Room air)       Current:/74 (BP Location: Right arm)   Pulse 78   Temp 99 °F (37.2 °C) (Temporal)   Resp 16   Ht 170.2 cm (5' 7\")   Wt 108.9 kg   SpO2 98%   BMI 37.59 kg/m²         Physical Exam  Constitutional:       Appearance: He is well-developed.   HENT:      Head: Normocephalic and atraumatic.      Right Ear: External ear normal.      Left Ear: External ear normal.      Nose: Nose normal.   Eyes:      Conjunctiva/sclera: Conjunctivae normal.      Pupils: Pupils are equal, round, and reactive to light.   Cardiovascular:      Rate and Rhythm: Normal rate and regular rhythm.      Heart sounds: Normal heart sounds.   Pulmonary:      Effort: Pulmonary effort is normal.      Breath sounds: Normal breath sounds.   Abdominal:      General: Bowel sounds are normal.      Palpations: Abdomen is soft.      Tenderness: There is abdominal tenderness in the right upper quadrant, epigastric area and left upper quadrant.   Musculoskeletal:         General: Normal range of motion.      Cervical back: Normal range of motion and neck supple.   Skin:     General: Skin is warm and dry.      Findings: No rash.   Neurological:      General: No focal deficit present.      Mental Status: He is alert and oriented to person, place, and time.      Deep Tendon Reflexes: Reflexes are normal and symmetric.   Psychiatric:         Mood and Affect: Mood normal.         Behavior: Behavior normal.         ED Course   Labs:   Labs Reviewed   BASIC METABOLIC PANEL (8) - Abnormal; Notable for the following components:       Result Value    Sodium 135 (*)     BUN 7 (*)     BUN/CREA Ratio 7.9 (*)     All other components within normal limits   CBC W/ DIFFERENTIAL - Abnormal; Notable for the following components:    WBC 15.0 (*)     Neutrophil Absolute Prelim 11.79 (*)     Neutrophil Absolute 11.79 (*)     Monocyte Absolute 1.27 (*)      All other components within normal limits   HEPATIC FUNCTION PANEL (7) - Normal   LIPASE - Normal   CBC WITH DIFFERENTIAL WITH PLATELET    Narrative:     The following orders were created for panel order CBC With Differential With Platelet.  Procedure                               Abnormality         Status                     ---------                               -----------         ------                     CBC W/ DIFFERENTIAL[[id number]]          Abnormal            Final result                 Please view results for these tests on the individual orders.   URINALYSIS WITH CULTURE REFLEX   BASIC METABOLIC PANEL (8)   CBC WITH DIFFERENTIAL WITH PLATELET     Radiology findings:  I personally reviewed the images.   CT ABDOMEN+PELVIS(CONTRAST ONLY)(CPT=74177)    Result Date: 2/15/2024  CONCLUSION:   Inflammatory fat stranding within the medial right upper quadrant adjacent to the pancreaticoduodenal groove.  Findings may be secondary to groove pancreatitis or duodenitis of the 2nd and 3rd portion of the duodenum.  No mass or abscess.  Hepatic steatosis  Multiple other incidental findings as described in the body of the report.      Dictated by (CST): Meir Rollins MD on 2/15/2024 at 3:58 PM     Finalized by (CST): Meir Rollins MD on 2/15/2024 at 4:15 PM             Cardiac Monitor: Interpreted by me.   Pulse Readings from Last 1 Encounters:   02/15/24 78   , sinus,     External non-ED records reviewed independently by me: PCP note from 9/1/21 confirms no chronic medical conditions    MDM   37 yo M w/ no PMHx came in with generalized abd pain x3 days. On exam, has upper abdominal tenderness.     Differential diagnoses considered includes, but is not limited to: gastritis, enteritis, SBO, cholecystitis, pancreatitis, gallstones, constipation    Will obtain the following tests: CBC, BMP, hepatic function panel, lipase, UA, CT abd/pelvis  Please see ED course for my independent review of these tests/imaging  results.    Initial Medications/Therapeutics administered: IVF bolus, zofran, reglan, pepcid, morphine    Chronic conditions affecting care: None    Workup and medications considered but not ordered: None    Social Determinants of Health that impacted care: None     ED course: Labs with leukocytosis of 15.0.  Otherwise electrolytes within normal limits.  No evidence of biliary obstructive process.  Independently viewed the CT abdomen pelvis which shows no evidence of obstructive process.  Agree with radiology read above which notes duodenitis versus groove pancreatitis.  Lipase is normal making pancreatitis less likely.  Patient continues to endorse significant symptoms.  I discussed patient's case with the [facility] hospitalist who accepted the patient for admission for further gastroenterology evaluation.  Endorsed to Dr. Nelson, gastroenterology who was made aware new consult.  Request n.p.o. at midnight, Protonix, admission for possible scope tomorrow.  Patient expressed understanding and agreement with this plan.      Disposition and Plan     Clinical Impression:  1. Duodenitis        Disposition:  Admit    Follow-up:  No follow-up provider specified.    Medications Prescribed:  Current Discharge Medication List          Hospital Problems       Present on Admission  Date Reviewed: 10/20/2023            ICD-10-CM Noted POA    * (Principal) Duodenitis K29.80 2/15/2024 Unknown        This note may have been created using voice dictation technology and may include inadvertent errors.      Farrah Freeman MD  Emergency Medicine

## 2024-02-16 NOTE — H&P
[facility]    PATIENT'S NAME: CHRISTOPHER LUTZ   ATTENDING PHYSICIAN: Farrah Freeman MD   PATIENT ACCOUNT#:   [id number]    LOCATION:  [address]  MEDICAL RECORD #:   [id number]       YOB: 1985  ADMISSION DATE:       02/15/2024    HISTORY AND PHYSICAL EXAMINATION    CHIEF COMPLAINT:  Abdominal pain.    HISTORY OF PRESENT ILLNESS:  The patient is a 38-year-old  male who presented to the emergency department for evaluation of midepigastric abdominal pain associated with nausea, vomiting, poor appetite for last 3 days.  CBC showed white blood cell count of 15.0 with left shift.  Chemistry, liver function tests were unremarkable.  Lipase was normal.  CT scan of the abdomen showed findings suggestive either groove pancreatitis or duodenitis with inflammation at the second and third portions.  Patient was given IV Pepcid, oral challenge failed, and patient will be admitted to the hospital for further management.      PAST MEDICAL HISTORY:  Patient denies medical problems.      PAST SURGICAL HISTORY:  None.    MEDICATIONS:  Please see medication reconciliation list.  Patient denies any over-the-counter NSAIDs use.    ALLERGIES:  No known drug allergies.  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     FAMILY HISTORY:  Both parents are alive.  Mother had asthma.    SOCIAL HISTORY:  He smokes 1/2 pack to 1 pack a day.  No significant alcohol or drug use.      REVIEW OF SYSTEMS:  Patient reports epigastric intense pain constant for last 2 to 3 days associated with nausea, vomiting, poor oral intake.  No radiation to the back.  He felt chills but no recorded fever.  Other 12-point review of systems is negative.      PHYSICAL EXAMINATION:    GENERAL:  Alert and oriented to time, place, and person.  Moderate distress.  VITAL SIGNS:  Temperature 99.0, pulse 75, respiratory rate 16, blood pressure 118/76, pulse ox 99% on room air.    HEENT:  Atraumatic.  Oropharynx clear.  Moist mucous  membranes.  Normal hard and soft palate.  Eyes:  Anicteric sclerae.    NECK:  Supple.  No lymphadenopathy.  Trachea midline.  Full range of motion.  LUNGS:  Clear to auscultation bilaterally.  Normal respiratory effort.    HEART:  Regular rate, rhythm.  S1 and S2 auscultated.  No murmur.   ABDOMEN:  Soft, nondistended.  No tenderness.  Positive bowel sounds.  Discomfort to palpation mid abdomen.  No guarding or rebound tenderness.  EXTREMITIES:  No peripheral edema, clubbing, or cyanosis.  NEUROLOGIC:  Motor and sensory intact.    ASSESSMENT:    1.   Abdominal pain with imaging study suggestive of duodenitis.  Rule out underlying peptic ulcer disease.  2.   Low-grade fever.     PLAN:  Continue to monitor his temperature curve.  If he spikes temperature above 101, we will obtain blood cultures.  Gastroenterology consult.  NPO after midnight.  Pain and nausea control.  Further recommendations to follow.     Dictated By Karan Rodriguez MD  d: 02/15/2024 18:31:16  t: 02/15/2024 18:36:29  Job [id number]  FB/    cc: Farrah Freeman MD

## 2024-02-16 NOTE — ANESTHESIA POSTPROCEDURE EVALUATION
Patient: Mario Doran    Procedure Summary       Date: 02/16/24 Room / Location: [facility] ENDOSCOPY 01 / [facility] ENDOSCOPY    Anesthesia Start: 1340 Anesthesia Stop:     Procedure: ESOPHAGOGASTRODUODENOSCOPY (EGD) Diagnosis: (gastric erosions)    Surgeons: Sydnee Bedoya MD Anesthesiologist: Sangeeta Abel MD    Anesthesia Type: MAC ASA Status: 2            Anesthesia Type: MAC    Vitals Value Taken Time   /65 02/16/24 1400   Temp 98.2 °F (36.8 °C) 02/16/24 1400   Pulse 87 02/16/24 1400   Resp 20 02/16/24 1400   SpO2 96 % 02/16/24 1400       [facility] AN Post Evaluation:   Patient Evaluated in PACU  Patient Participation: complete - patient participated  Level of Consciousness: awake and alert  Pain Score: 0  Pain Management: adequate  Airway Patency:patent  Dental exam unchanged from preop  Yes    Nausea/Vomiting: none  Cardiovascular Status: stable  Respiratory Status: room air  Postoperative Hydration stable      SANGEETA ABEL MD  2/16/2024 2:01 PM

## 2024-02-16 NOTE — PROGRESS NOTES
[facility]    Progress Note    Mario Doran Patient Status:  Inpatient    1985 MRN [id number]   Location [facility] 1W Attending Melissa Ojeda MD   Hosp Day # 1 PCP BRAD RO MD       Subjective:   Mario Doran is resting in bed. Afebrile. Abdominal pain is controlled with the morphine.     Objective:   Blood pressure 131/77, pulse 83, temperature 98.1 °F (36.7 °C), temperature source Oral, resp. rate 16, height 5' 7\" (1.702 m), weight 291 lb 3.2 oz (132.1 kg), SpO2 96%.    Physical Exam:    General: No acute distress.   Respiratory: Clear to auscultation bilaterally. No wheezes. No rhonchi.  Cardiovascular: S1, S2. Regular rate and rhythm. No murmurs, rubs or gallops.   Abdomen: Soft, nontender, nondistended.  Positive bowel sounds. No rebound or guarding.  Neurologic: No focal neurological deficits.   Musculoskeletal: Moves all extremities.  Extremities: No edema.    Results:     Lab Results   Component Value Date    WBC 14.0 (H) 2024    HGB 12.8 (L) 2024    HCT 39.3 2024    .0 2024    CREATSERUM 0.82 2024    BUN 6 (L) 2024     2024    K 3.8 2024     2024    CO2 29.0 2024     (H) 2024    CA 8.5 (L) 2024    ALB 4.3 02/15/2024    ALKPHO 75 02/15/2024    BILT 0.6 02/15/2024    TP 7.7 02/15/2024    AST 28 02/15/2024    ALT 45 02/15/2024    TSH 1.180 2021    LIP 35 02/15/2024       CT ABDOMEN+PELVIS(CONTRAST ONLY)(CPT=74177)    Result Date: 2/15/2024  CONCLUSION:   Inflammatory fat stranding within the medial right upper quadrant adjacent to the pancreaticoduodenal groove.  Findings may be secondary to groove pancreatitis or duodenitis of the 2nd and 3rd portion of the duodenum.  No mass or abscess.  Hepatic steatosis  Multiple other incidental findings as described in the body of the report.      Dictated by (CST): Meir Rollins MD on 2/15/2024 at 3:58 PM     Finalized by  (CST): Meir Rollins MD on 2/15/2024 at 4:15 PM              pantoprazole  40 mg Intravenous Q12H         Assessment and Plan:     Duodenitis  - found on CT of the abdomen and pelvis  - GI on board  - Protonix 40 mg IV BID  - EGD today   - NPO  - IV morphine for pain  - can switch to norco once he can eat     DVT proph- SCDs    Full code    MDM High. Time spent on chart/note review, review of labs/imaging, discussion with patient, physical exam, discussion with staff, consultants, coordinating care, writing progress note, and discussion of plan of care.           LEXII BEAVERS MD  02/16/24

## 2024-02-16 NOTE — INTERVAL H&P NOTE
Pre-op Diagnosis: epigastric pain    The above referenced H&P was reviewed by Sydnee James Ma, MD on 2/16/2024, the patient was examined and no significant changes have occurred in the patient's condition since the H&P was performed.  I discussed with the patient and/or legal representative the potential benefits, risks and side effects of this procedure; the likelihood of the patient achieving goals; and potential problems that might occur during recuperation.  I discussed reasonable alternatives to the procedure, including risks, benefits and side effects related to the alternatives and risks related to not receiving this procedure.  We will proceed with procedure as planned.

## 2024-02-16 NOTE — H&P (VIEW-ONLY)
Is this a shared or split note between Advanced Practice Provider and Physician? Yes       [facility]   Gastroenterology Consultation Note    Mario Doran  Patient Status:    Inpatient  Date of Admission:         2/15/2024, Hospital day #1  Attending:   Melissa Ojeda MD  PCP:     BRAD RO MD    Reason for Consultation:  Abdominal pain    History of Present Illness:  Mario Doran is a 38 year old male w/ no pertinent medical history who presented to the ER on 2/15 for generalized abdominal pain.     Patient notes abdominal pain began 4 days ago. Began in epigastric region and has been constant. Notes can wax and wean in intensity. Often fluctuates between a 5-10/10. Worse with food and movement. Only improvement so far has been morphine in the ER. Pain can radiate to his back and to both RUQ and LUQ.     He notes daily GERD often with nocturnal symptoms. He endorses continued nausea with episodes of vomiting since the abdominal pain began. Appetite has been decreased.     Weight is stable.     CT a/p in ER demonstrated inflammatory fat stranding within the medial right upper quadrant adjacent to the pancreaticoduodenal groove. Possible groove pancreatitis or duodenitis of the 2nd or 3rd portion of the duodenum. Lipase normal. AST, ALT, alk phos and bili early normal. WBC 15. Hemoglobin 14.4.     Today WBC improved to 14 and hemoglobin now 12.8.     Pertinent Family Hx:  - No known history of esophageal, gastric or colon cancers  - No known IBD  - No known liver pathologies    Endoscopy Hx:  - none    Social Hx:  - Alcohol use- 1-2 drinks a month   - Tobacco use- None  - Denies illicit drug use  - Lives with: family      History:  History reviewed. No pertinent past medical history.  History reviewed. No pertinent surgical history.  Family History   Problem Relation Age of Onset    Asthma Mother     Asthma Maternal Grandmother       reports that he has quit smoking. He has never used  smokeless tobacco. He reports current alcohol use. He reports that he does not use drugs.    Allergies:  Allergies   Allergen Reactions    Seasonal OTHER (SEE COMMENTS)     Sneezing, watery eyes      Avocado ITCHING    Bananas ITCHING       Medications:    Current Facility-Administered Medications:     pantoprazole (Protonix) 40 mg in sodium chloride 0.9% PF 10 mL IV push, 40 mg, Intravenous, Daily    dextrose 5%-sodium chloride 0.9% infusion, , Intravenous, Continuous    acetaminophen (Tylenol Extra Strength) tab 500 mg, 500 mg, Oral, Q4H PRN    morphINE PF 2 MG/ML injection 1 mg, 1 mg, Intravenous, Q2H PRN **OR** morphINE PF 2 MG/ML injection 2 mg, 2 mg, Intravenous, Q2H PRN **OR** morphINE PF 4 MG/ML injection 4 mg, 4 mg, Intravenous, Q2H PRN    ondansetron (Zofran) 4 MG/2ML injection 4 mg, 4 mg, Intravenous, Q6H PRN    prochlorperazine (Compazine) 10 MG/2ML injection 5 mg, 5 mg, Intravenous, Q8H PRN    temazepam (Restoril) cap 15 mg, 15 mg, Oral, Nightly PRN    Review of Systems:   CONSTITUTIONAL:  negative for fevers, chills, unintentional weight loss   EYES:  negative for diplopia or change in vision   RESPIRATORY:  negative for severe shortness of breath  CARDIOVASCULAR:  negative for crushing sub-sternal chest pain  GASTROINTESTINAL:  see HPI  GENITOURINARY:  negative for dysuria or gross hematuria  INTEGUMENT/BREAST:  SKIN:  negative for jaundice or new rash   ALLERGIC/IMMUNOLOGIC:  negative for hay fever   ENDOCRINE:  negative for cold intolerance and heat intolerance  MUSCULOSKELETAL:  negative for joint effusion/severe erythema  NEURO: negative for new loss of consciousness or dizziness   BEHAVIOR/PSYCH:  negative for psychotic behavior    Physical Exam:    Blood pressure 131/77, pulse 83, temperature 98.1 °F (36.7 °C), temperature source Oral, resp. rate 16, height 5' 7\" (1.702 m), weight 291 lb 3.2 oz (132.1 kg), SpO2 96%. Body mass index is 45.61 kg/m².    Gen: awake, alert patient, NAD  HEENT: EOMI,  the sclera appears anicteric, oropharynx clear, mucus membranes appear moist  CV: RRR  Lung: no conversational dyspnea   Abdomen: generalized TTP to upper abdomen. No rebound or guarding. Normoactive bowel sounds.   Back: No CVA tenderness   Skin: dry, warm, no jaundice  Ext: no LE edema is evident  Neuro: Alert and interactive  Psych: calm, cooperative    Laboratory Data:  Lab Results   Component Value Date    WBC 14.0 02/16/2024    HGB 12.8 02/16/2024    HCT 39.3 02/16/2024    .0 02/16/2024    CREATSERUM 0.82 02/16/2024    BUN 6 02/16/2024     02/16/2024    K 3.8 02/16/2024     02/16/2024    CO2 29.0 02/16/2024     02/16/2024    CA 8.5 02/16/2024    ALB 4.3 02/15/2024    ALKPHO 75 02/15/2024    BILT 0.6 02/15/2024    TP 7.7 02/15/2024    AST 28 02/15/2024    ALT 45 02/15/2024    LIP 35 02/15/2024       Imaging:  CT ABDOMEN+PELVIS(CONTRAST ONLY)(CPT=74177)    Result Date: 2/15/2024  CONCLUSION:   Inflammatory fat stranding within the medial right upper quadrant adjacent to the pancreaticoduodenal groove.  Findings may be secondary to groove pancreatitis or duodenitis of the 2nd and 3rd portion of the duodenum.  No mass or abscess.  Hepatic steatosis  Multiple other incidental findings as described in the body of the report.      Dictated by (CST): Meir Rollins MD on 2/15/2024 at 3:58 PM     Finalized by (CST): Meir Rollins MD on 2/15/2024 at 4:15 PM           Assessment & Plan   Mario Doran is a 38 year old male w/ no pertinent medical history who presented to the ER on 2/15 for generalized abdominal pain.     Patient notes abdominal pain began 4 days ago. Began in epigastric region and has been constant. Notes can wax and wean in intensity. Often fluctuates between a 5-10/10. Worse with food and movement. Only improvement so far has been morphine in the ER. Pain can radiate to his back and to both RUQ and LUQ.     He notes daily GERD often with nocturnal symptoms. He endorses continued  nausea with episodes of vomiting since the abdominal pain began. Appetite has been decreased.     Weight is stable.     CT a/p in ER demonstrated inflammatory fat stranding within the medial right upper quadrant adjacent to the pancreaticoduodenal groove. Possible groove pancreatitis or duodenitis of the 2nd or 3rd portion of the duodenum. Lipase normal. AST, ALT, alk phos and bili early normal. WBC 15. Hemoglobin 14.4.     #Abdominal pain  Discussed with patient pain possible due to PUD, duodenitis, GERD with esophagitis vs possible pancreatitis. Advised for EGD at this time. Pancreatitis less likely with normal lipase, no cholelithiasis and lack of tobacco or alcohol use. Will obtain triglycerides at this time as well. Plan to treat with BID PPI.     EGD consent: I have discussed the risks, benefits, and alternatives to upper endoscopy/enteroscopy with the patient/primary decision maker [who demonstrated understanding], including but not limited to the risks of bleeding, infection, pain, death, as well as the risks of anesthesia and perforation all leading to prolonged hospitalization, surgical intervention, or even death. I also specifically mentioned the miss rate of upper endoscopy of 5-10% in the best of all circumstances.  The patient has agreed to sign an informed consent and elected to proceed with procedure with possible intervention [i.e. polypectomy, stent placement, etc.] as indicated.    Recommend:  - empiric PPI   - NPO  - EGD today   - pain per primary       Thank you for the opportunity to participate in the care of this patient.    Case discussed with Sydnee Bedoya MD and Portia COREAS.    Zunilda Jamil PA-C  [facility]  2/16/2024

## 2024-02-16 NOTE — CONSULTS
Is this a shared or split note between Advanced Practice Provider and Physician? Yes       [facility]   Gastroenterology Consultation Note    Mario Doran  Patient Status:    Inpatient  Date of Admission:         2/15/2024, Hospital day #1  Attending:   Melissa Ojeda MD  PCP:     BRAD RO MD    Reason for Consultation:  Abdominal pain    History of Present Illness:  Mario Doran is a 38 year old male w/ no pertinent medical history who presented to the ER on 2/15 for generalized abdominal pain.     Patient notes abdominal pain began 4 days ago. Began in epigastric region and has been constant. Notes can wax and wean in intensity. Often fluctuates between a 5-10/10. Worse with food and movement. Only improvement so far has been morphine in the ER. Pain can radiate to his back and to both RUQ and LUQ.     He notes daily GERD often with nocturnal symptoms. He endorses continued nausea with episodes of vomiting since the abdominal pain began. Appetite has been decreased.     Weight is stable.     CT a/p in ER demonstrated inflammatory fat stranding within the medial right upper quadrant adjacent to the pancreaticoduodenal groove. Possible groove pancreatitis or duodenitis of the 2nd or 3rd portion of the duodenum. Lipase normal. AST, ALT, alk phos and bili early normal. WBC 15. Hemoglobin 14.4.     Today WBC improved to 14 and hemoglobin now 12.8.     Pertinent Family Hx:  - No known history of esophageal, gastric or colon cancers  - No known IBD  - No known liver pathologies    Endoscopy Hx:  - none    Social Hx:  - Alcohol use- 1-2 drinks a month   - Tobacco use- None  - Denies illicit drug use  - Lives with: family      History:  History reviewed. No pertinent past medical history.  History reviewed. No pertinent surgical history.  Family History   Problem Relation Age of Onset    Asthma Mother     Asthma Maternal Grandmother       reports that he has quit smoking. He has never used  smokeless tobacco. He reports current alcohol use. He reports that he does not use drugs.    Allergies:  Allergies   Allergen Reactions    Seasonal OTHER (SEE COMMENTS)     Sneezing, watery eyes      Avocado ITCHING    Bananas ITCHING       Medications:    Current Facility-Administered Medications:     pantoprazole (Protonix) 40 mg in sodium chloride 0.9% PF 10 mL IV push, 40 mg, Intravenous, Daily    dextrose 5%-sodium chloride 0.9% infusion, , Intravenous, Continuous    acetaminophen (Tylenol Extra Strength) tab 500 mg, 500 mg, Oral, Q4H PRN    morphINE PF 2 MG/ML injection 1 mg, 1 mg, Intravenous, Q2H PRN **OR** morphINE PF 2 MG/ML injection 2 mg, 2 mg, Intravenous, Q2H PRN **OR** morphINE PF 4 MG/ML injection 4 mg, 4 mg, Intravenous, Q2H PRN    ondansetron (Zofran) 4 MG/2ML injection 4 mg, 4 mg, Intravenous, Q6H PRN    prochlorperazine (Compazine) 10 MG/2ML injection 5 mg, 5 mg, Intravenous, Q8H PRN    temazepam (Restoril) cap 15 mg, 15 mg, Oral, Nightly PRN    Review of Systems:   CONSTITUTIONAL:  negative for fevers, chills, unintentional weight loss   EYES:  negative for diplopia or change in vision   RESPIRATORY:  negative for severe shortness of breath  CARDIOVASCULAR:  negative for crushing sub-sternal chest pain  GASTROINTESTINAL:  see HPI  GENITOURINARY:  negative for dysuria or gross hematuria  INTEGUMENT/BREAST:  SKIN:  negative for jaundice or new rash   ALLERGIC/IMMUNOLOGIC:  negative for hay fever   ENDOCRINE:  negative for cold intolerance and heat intolerance  MUSCULOSKELETAL:  negative for joint effusion/severe erythema  NEURO: negative for new loss of consciousness or dizziness   BEHAVIOR/PSYCH:  negative for psychotic behavior    Physical Exam:    Blood pressure 131/77, pulse 83, temperature 98.1 °F (36.7 °C), temperature source Oral, resp. rate 16, height 5' 7\" (1.702 m), weight 291 lb 3.2 oz (132.1 kg), SpO2 96%. Body mass index is 45.61 kg/m².    Gen: awake, alert patient, NAD  HEENT: EOMI,  the sclera appears anicteric, oropharynx clear, mucus membranes appear moist  CV: RRR  Lung: no conversational dyspnea   Abdomen: generalized TTP to upper abdomen. No rebound or guarding. Normoactive bowel sounds.   Back: No CVA tenderness   Skin: dry, warm, no jaundice  Ext: no LE edema is evident  Neuro: Alert and interactive  Psych: calm, cooperative    Laboratory Data:  Lab Results   Component Value Date    WBC 14.0 02/16/2024    HGB 12.8 02/16/2024    HCT 39.3 02/16/2024    .0 02/16/2024    CREATSERUM 0.82 02/16/2024    BUN 6 02/16/2024     02/16/2024    K 3.8 02/16/2024     02/16/2024    CO2 29.0 02/16/2024     02/16/2024    CA 8.5 02/16/2024    ALB 4.3 02/15/2024    ALKPHO 75 02/15/2024    BILT 0.6 02/15/2024    TP 7.7 02/15/2024    AST 28 02/15/2024    ALT 45 02/15/2024    LIP 35 02/15/2024       Imaging:  CT ABDOMEN+PELVIS(CONTRAST ONLY)(CPT=74177)    Result Date: 2/15/2024  CONCLUSION:   Inflammatory fat stranding within the medial right upper quadrant adjacent to the pancreaticoduodenal groove.  Findings may be secondary to groove pancreatitis or duodenitis of the 2nd and 3rd portion of the duodenum.  No mass or abscess.  Hepatic steatosis  Multiple other incidental findings as described in the body of the report.      Dictated by (CST): Meir Rollins MD on 2/15/2024 at 3:58 PM     Finalized by (CST): Meir Rollins MD on 2/15/2024 at 4:15 PM           Assessment & Plan   Mario Doran is a 38 year old male w/ no pertinent medical history who presented to the ER on 2/15 for generalized abdominal pain.     Patient notes abdominal pain began 4 days ago. Began in epigastric region and has been constant. Notes can wax and wean in intensity. Often fluctuates between a 5-10/10. Worse with food and movement. Only improvement so far has been morphine in the ER. Pain can radiate to his back and to both RUQ and LUQ.     He notes daily GERD often with nocturnal symptoms. He endorses continued  nausea with episodes of vomiting since the abdominal pain began. Appetite has been decreased.     Weight is stable.     CT a/p in ER demonstrated inflammatory fat stranding within the medial right upper quadrant adjacent to the pancreaticoduodenal groove. Possible groove pancreatitis or duodenitis of the 2nd or 3rd portion of the duodenum. Lipase normal. AST, ALT, alk phos and bili early normal. WBC 15. Hemoglobin 14.4.     #Abdominal pain  Discussed with patient pain possible due to PUD, duodenitis, GERD with esophagitis vs possible pancreatitis. Advised for EGD at this time. Pancreatitis less likely with normal lipase, no cholelithiasis and lack of tobacco or alcohol use. Will obtain triglycerides at this time as well. Plan to treat with BID PPI.     EGD consent: I have discussed the risks, benefits, and alternatives to upper endoscopy/enteroscopy with the patient/primary decision maker [who demonstrated understanding], including but not limited to the risks of bleeding, infection, pain, death, as well as the risks of anesthesia and perforation all leading to prolonged hospitalization, surgical intervention, or even death. I also specifically mentioned the miss rate of upper endoscopy of 5-10% in the best of all circumstances.  The patient has agreed to sign an informed consent and elected to proceed with procedure with possible intervention [i.e. polypectomy, stent placement, etc.] as indicated.    Recommend:  - empiric PPI   - NPO  - EGD today   - pain per primary       Thank you for the opportunity to participate in the care of this patient.    Case discussed with Sydnee Bedoya MD and Portia COREAS.    Zunilda Jamil PA-C  [facility]  2/16/2024

## 2024-02-16 NOTE — PLAN OF CARE
Mario resting well. Pain more manageable with iv pain medication.       Problem: Patient Centered Care  Goal: Patient preferences are identified and integrated in the patient's plan of care  Description: Interventions:  - What would you like us to know as we care for you? \"I have been in pain for 3 days.\"  - Provide timely, complete, and accurate information to patient/family  - Incorporate patient and family knowledge, values, beliefs, and cultural backgrounds into the planning and delivery of care  - Encourage patient/family to participate in care and decision-making at the level they choose  - Honor patient and family perspectives and choices  Outcome: Progressing     Problem: Patient/Family Goals  Goal: Patient/Family Long Term Goal  Description: Patient's Long Term Goal: less pain    Interventions:  - monitor pain level  - administer IV pain medications as needed  - See additional Care Plan goals for specific interventions  Outcome: Progressing  Goal: Patient/Family Short Term Goal  Description: Patient's Short Term Goal: To be able to eat    Interventions:   - Monitor pain  - monitor for nausea  - monitor for vomiting  - GI consult  - See additional Care Plan goals for specific interventions  Outcome: Progressing     Problem: PAIN - ADULT  Goal: Verbalizes/displays adequate comfort level or patient's stated pain goal  Description: INTERVENTIONS:  - Encourage pt to monitor pain and request assistance  - Assess pain using appropriate pain scale  - Administer analgesics based on type and severity of pain and evaluate response  - Implement non-pharmacological measures as appropriate and evaluate response  - Consider cultural and social influences on pain and pain management  - Manage/alleviate anxiety  - Utilize distraction and/or relaxation techniques  - Monitor for opioid side effects  - Notify MD/LIP if interventions unsuccessful or patient reports new pain  - Anticipate increased pain with activity and  pre-medicate as appropriate  Outcome: Progressing

## 2024-02-16 NOTE — OPERATIVE REPORT
ESOPHAGOGASTRODUODENOSCOPY (EGD) REPORT    Mario Doran     1985 Age 38 year old   PCP BRAD RO MD Endoscopist Sydnee Bedoya MD     Date of procedure: 24    Procedure: EGD w/biopsy    Pre-operative diagnosis: epigastric pain    Post-operative diagnosis: see impression    Medications: MAC    Complications: none    Procedure:  Informed consent was obtained from the patient after the risks of the procedure were discussed, including but not limited to bleeding, perforation, aspiration, infection, or possibility of a missed lesion. After discussions of the risks/benefits and alternatives to this procedure, as well as the planned sedation, the patient was placed in the left lateral decubitus position and begun on continuous blood pressure pulse oximetry and EKG monitoring and this was maintained throughout the procedure. Once an adequate level of sedation was obtained a bite block was placed. Then the lubricated tip of the [id number] diagnostic video upper endoscope was inserted and advanced using direct visualization into the posterior pharynx and ultimately into the esophagus, stomach, and duodenum.    Complications: None    Findings:      1. Esophagus: The squamocolumnar junction was noted at 40 cm and appeared normal. The diaphragmatic pinch was noted noted at 40 cm from the incisors. The esophagus appeared normal throughout its entire length with no evidence of rings, webs or luminal narrowing. There was no evidence of erosions, ulcerations, varices, or masses.    2. Stomach: We then entered the stomach. Gastric mucosa appeared erythematous with antral erosions forward view with no evidence of ulcerations. There was no evidence of any luminal or submucosal masses. A retroflexed view allowed examination of the angularis, cardia and fundus and these areas also appeared normal with a non-patulous cardia. No hiatal hernia seen. Random biopsies of the stomach (body, antrum, cardia, and incisura)  were taken with cold forceps for histology.     3. Duodenum: The duodenal mucosa appeared normal in the 1st and 2nd portion of the duodenum. Bilious effluent    We then withdrew the instrument from the patient who tolerated the procedure well.     Impression:   1. Antral erosions and erythema, biopsied    Recommend:  1. Continue ppi bid  2. Clears today  3. Considerations for MRCP if pain does not improve    >>>If tissue was sampled/removed and you have not received your pathology results either by phone or letter within 2 weeks, please call our office at [phone].    Specimens:  Gastric     Blood loss: <1 ml      Sydnee Bedoya MD  [facility]

## 2024-02-17 ENCOUNTER — APPOINTMENT (OUTPATIENT)
Dept: ULTRASOUND IMAGING | Facility: HOSPITAL | Age: 39
End: 2024-02-17
Attending: INTERNAL MEDICINE

## 2024-02-17 VITALS
HEART RATE: 80 BPM | TEMPERATURE: 99 F | WEIGHT: 291.19 LBS | HEIGHT: 67 IN | SYSTOLIC BLOOD PRESSURE: 118 MMHG | BODY MASS INDEX: 45.7 KG/M2 | RESPIRATION RATE: 18 BRPM | DIASTOLIC BLOOD PRESSURE: 73 MMHG | OXYGEN SATURATION: 98 %

## 2024-02-17 LAB
ANION GAP SERPL CALC-SCNC: 5 MMOL/L (ref 0–18)
BUN BLD-MCNC: <5 MG/DL (ref 9–23)
CALCIUM BLD-MCNC: 9 MG/DL (ref 8.7–10.4)
CHLORIDE SERPL-SCNC: 104 MMOL/L (ref 98–112)
CO2 SERPL-SCNC: 30 MMOL/L (ref 21–32)
CREAT BLD-MCNC: 0.93 MG/DL
DEPRECATED RDW RBC AUTO: 42 FL (ref 35.1–46.3)
EGFRCR SERPLBLD CKD-EPI 2021: 106 ML/MIN/1.73M2 (ref 60–?)
ERYTHROCYTE [DISTWIDTH] IN BLOOD BY AUTOMATED COUNT: 13.3 % (ref 11–15)
GLUCOSE BLD-MCNC: 103 MG/DL (ref 70–99)
HCT VFR BLD AUTO: 38.7 %
HGB BLD-MCNC: 13 G/DL
MCH RBC QN AUTO: 28.9 PG (ref 26–34)
MCHC RBC AUTO-ENTMCNC: 33.6 G/DL (ref 31–37)
MCV RBC AUTO: 86 FL
PLATELET # BLD AUTO: 211 10(3)UL (ref 150–450)
POTASSIUM SERPL-SCNC: 3.8 MMOL/L (ref 3.5–5.1)
RBC # BLD AUTO: 4.5 X10(6)UL
SODIUM SERPL-SCNC: 139 MMOL/L (ref 136–145)
WBC # BLD AUTO: 12.3 X10(3) UL (ref 4–11)

## 2024-02-17 PROCEDURE — 99239 HOSP IP/OBS DSCHRG MGMT >30: CPT | Performed by: HOSPITALIST

## 2024-02-17 PROCEDURE — 76705 ECHO EXAM OF ABDOMEN: CPT | Performed by: INTERNAL MEDICINE

## 2024-02-17 PROCEDURE — 99232 SBSQ HOSP IP/OBS MODERATE 35: CPT | Performed by: INTERNAL MEDICINE

## 2024-02-17 RX ORDER — PANTOPRAZOLE SODIUM 40 MG/1
40 TABLET, DELAYED RELEASE ORAL DAILY
Qty: 30 TABLET | Refills: 0 | Status: SHIPPED | OUTPATIENT
Start: 2024-02-17

## 2024-02-17 NOTE — DISCHARGE SUMMARY
[facility]    Discharge Summary    Mario Doran Patient Status:  Inpatient    1985 MRN [id number]   Location [facility] 1W Attending Melissa Ojeda MD   Hosp Day # 2 PCP BRAD RO MD     Date of Admission: 2/15/2024   Date of Discharge: No discharge date for patient encounter.    Admitting Diagnosis: Duodenitis [K29.80]    Disposition: Home    Discharge Diagnosis: .Principal Problem:    Duodenitis      Hospital Course:   Reason for Admission: ***    Discharge Physical Exam:   Physical Exam:    General: No acute distress.   Respiratory: Clear to auscultation bilaterally. No wheezes. No rhonchi.  Cardiovascular: S1, S2. Regular rate and rhythm. No murmurs, rubs or gallops.   Abdomen: Soft, nontender, nondistended.  Positive bowel sounds. No rebound or guarding.  Neurologic: No focal neurological deficits.   Musculoskeletal: Moves all extremities.    Hospital Course: ***    Complications:     Consultants         Provider   Role Specialty     Antionette Nelson MD  Consulting Physician GASTROENTEROLOGY          Surgical Procedures       Case IDs Date Procedure Surgeon Location Status    [id number] ESOPHAGOGASTRODUODENOSCOPY (EGD) Sydnee Bedoya MD [facility] ENDOSCOPY Melissa          Pending Labs       Order Current Status    Specimen to Pathology Tissue In process            Discharge Plan:   Discharge Condition: Stable    Current Discharge Medication List        New Orders    Details   pantoprazole 40 MG Oral Tab EC Take 1 tablet (40 mg total) by mouth daily.           Home Meds - Unchanged    Details   fluticasone propionate 50 MCG/ACT Nasal Suspension 2 sprays by Nasal route daily.      cetirizine 10 MG Oral Tab Take 1 tablet (10 mg total) by mouth daily.      ergocalciferol 1.25 MG (45675 UT) Oral Cap Take by mouth every 7 days.                 Discharge Diet: {Discharge Diet:5710}    Discharge Activity: As tolerated       Discharge Medications        START taking these medications         Instructions Prescription details   pantoprazole 40 MG Tbec  Commonly known as: Protonix      Take 1 tablet (40 mg total) by mouth daily.   Quantity: 30 tablet  Refills: 0            CHANGE how you take these medications        Instructions Prescription details   cetirizine 10 MG Tabs  Commonly known as: ZyrTEC  What changed:   when to take this  reasons to take this      Take 1 tablet (10 mg total) by mouth daily.   Quantity: 30 tablet  Refills: 0     fluticasone propionate 50 MCG/ACT Susp  Commonly known as: Flonase  What changed:   when to take this  reasons to take this      2 sprays by Nasal route daily.   Quantity: 16 g  Refills: 0            CONTINUE taking these medications        Instructions Prescription details   ergocalciferol 1.25 MG (82151 UT) Caps  Commonly known as: Vitamin D2      Take by mouth every 7 days.   Refills: 0               Where to Get Your Medications        These medications were sent to [id number] - [place], [place] - [address] AT [facility] [address], [phone], [phone]  [address], [facility] [address]      Phone: [phone]   pantoprazole 40 MG Tbec         Follow up:       Follow up Labs and imaging:         Time spent:  > 30 minutes    LEXII BEAVERS MD  2/17/2024

## 2024-02-17 NOTE — PLAN OF CARE
Patient a&ox4. Sating well on RA. IVF and IV protonix as ordered. Tolerating CL liquid diet. Self care. States mild pain but refusing pain medication. Low grade fever 99 overnight. All needs met at this time.   Problem: Patient Centered Care  Goal: Patient preferences are identified and integrated in the patient's plan of care  Description: Interventions:  - What would you like us to know as we care for you? \"I have been in pain for 3 days.\"  - Provide timely, complete, and accurate information to patient/family  - Incorporate patient and family knowledge, values, beliefs, and cultural backgrounds into the planning and delivery of care  - Encourage patient/family to participate in care and decision-making at the level they choose  - Honor patient and family perspectives and choices  Outcome: Progressing     Problem: Patient/Family Goals  Goal: Patient/Family Long Term Goal  Description: Patient's Long Term Goal: less pain    Interventions:  - monitor pain level  - administer IV pain medications as needed  - See additional Care Plan goals for specific interventions  Outcome: Progressing  Goal: Patient/Family Short Term Goal  Description: Patient's Short Term Goal: To be able to eat    Interventions:   - Monitor pain  - monitor for nausea  - monitor for vomiting  - GI consult  - See additional Care Plan goals for specific interventions  Outcome: Progressing     Problem: PAIN - ADULT  Goal: Verbalizes/displays adequate comfort level or patient's stated pain goal  Description: INTERVENTIONS:  - Encourage pt to monitor pain and request assistance  - Assess pain using appropriate pain scale  - Administer analgesics based on type and severity of pain and evaluate response  - Implement non-pharmacological measures as appropriate and evaluate response  - Consider cultural and social influences on pain and pain management  - Manage/alleviate anxiety  - Utilize distraction and/or relaxation techniques  - Monitor for opioid  side effects  - Notify MD/LIP if interventions unsuccessful or patient reports new pain  - Anticipate increased pain with activity and pre-medicate as appropriate  Outcome: Progressing     Problem: GASTROINTESTINAL - ADULT  Goal: Minimal or absence of nausea and vomiting  Description: INTERVENTIONS:  - Maintain adequate hydration with IV or PO as ordered and tolerated  - Nasogastric tube to low intermittent suction as ordered  - Evaluate effectiveness of ordered antiemetic medications  - Provide nonpharmacologic comfort measures as appropriate  - Advance diet as tolerated, if ordered  - Obtain nutritional consult as needed  - Evaluate fluid balance  Outcome: Progressing  Goal: Maintains or returns to baseline bowel function  Description: INTERVENTIONS:  - Assess bowel function  - Maintain adequate hydration with IV or PO as ordered and tolerated  - Evaluate effectiveness of GI medications  - Encourage mobilization and activity  - Obtain nutritional consult as needed  - Establish a toileting routine/schedule  - Consider collaborating with pharmacy to review patient's medication profile  Outcome: Progressing  Goal: Maintains adequate nutritional intake (undernourished)  Description: INTERVENTIONS:  - Monitor percentage of each meal consumed  - Identify factors contributing to decreased intake, treat as appropriate  - Assist with meals as needed  - Monitor I&O, WT and lab values  - Obtain nutritional consult as needed  - Optimize oral hygiene and moisture  - Encourage food from home; allow for food preferences  - Enhance eating environment  Outcome: Progressing  Goal: Achieves appropriate nutritional intake (bariatric)  Description: INTERVENTIONS:  - Monitor for over-consumption  - Identify factors contributing to increased intake, treat as appropriate  - Monitor I&O, WT and lab values  - Obtain nutritional consult as needed  - Evaluate psychosocial factors contributing to over-consumption  Outcome: Progressing

## 2024-02-17 NOTE — DISCHARGE PLANNING
Discharge papers reviewed with patient at bedside. Understands medication changes and to follow up with pcp. IV removed. All questions answered. All belongings and discharge papers sent with patient. Mom to drive patient home.

## 2024-02-17 NOTE — PLAN OF CARE
Problem: Patient Centered Care  Goal: Patient preferences are identified and integrated in the patient's plan of care  Description: Interventions:  - What would you like us to know as we care for you? \"I have been in pain for 3 days.\"  - Provide timely, complete, and accurate information to patient/family  - Incorporate patient and family knowledge, values, beliefs, and cultural backgrounds into the planning and delivery of care  - Encourage patient/family to participate in care and decision-making at the level they choose  - Honor patient and family perspectives and choices  Outcome: Progressing     Problem: Patient/Family Goals  Goal: Patient/Family Long Term Goal  Description: Patient's Long Term Goal: less pain    Interventions:  - monitor pain level  - administer IV pain medications as needed  - See additional Care Plan goals for specific interventions  Outcome: Progressing  Goal: Patient/Family Short Term Goal  Description: Patient's Short Term Goal: To be able to eat    Interventions:   - Monitor pain  - monitor for nausea  - monitor for vomiting  - GI consult  - See additional Care Plan goals for specific interventions  Outcome: Progressing     Problem: PAIN - ADULT  Goal: Verbalizes/displays adequate comfort level or patient's stated pain goal  Description: INTERVENTIONS:  - Encourage pt to monitor pain and request assistance  - Assess pain using appropriate pain scale  - Administer analgesics based on type and severity of pain and evaluate response  - Implement non-pharmacological measures as appropriate and evaluate response  - Consider cultural and social influences on pain and pain management  - Manage/alleviate anxiety  - Utilize distraction and/or relaxation techniques  - Monitor for opioid side effects  - Notify MD/LIP if interventions unsuccessful or patient reports new pain  - Anticipate increased pain with activity and pre-medicate as appropriate  Outcome: Progressing     Problem:  GASTROINTESTINAL - ADULT  Goal: Minimal or absence of nausea and vomiting  Description: INTERVENTIONS:  - Maintain adequate hydration with IV or PO as ordered and tolerated  - Nasogastric tube to low intermittent suction as ordered  - Evaluate effectiveness of ordered antiemetic medications  - Provide nonpharmacologic comfort measures as appropriate  - Advance diet as tolerated, if ordered  - Obtain nutritional consult as needed  - Evaluate fluid balance  Outcome: Progressing  Goal: Maintains or returns to baseline bowel function  Description: INTERVENTIONS:  - Assess bowel function  - Maintain adequate hydration with IV or PO as ordered and tolerated  - Evaluate effectiveness of GI medications  - Encourage mobilization and activity  - Obtain nutritional consult as needed  - Establish a toileting routine/schedule  - Consider collaborating with pharmacy to review patient's medication profile  Outcome: Progressing  Goal: Maintains adequate nutritional intake (undernourished)  Description: INTERVENTIONS:  - Monitor percentage of each meal consumed  - Identify factors contributing to decreased intake, treat as appropriate  - Assist with meals as needed  - Monitor I&O, WT and lab values  - Obtain nutritional consult as needed  - Optimize oral hygiene and moisture  - Encourage food from home; allow for food preferences  - Enhance eating environment  Outcome: Progressing  Goal: Achieves appropriate nutritional intake (bariatric)  Description: INTERVENTIONS:  - Monitor for over-consumption  - Identify factors contributing to increased intake, treat as appropriate  - Monitor I&O, WT and lab values  - Obtain nutritional consult as needed  - Evaluate psychosocial factors contributing to over-consumption  Outcome: Progressing

## 2024-02-17 NOTE — PROGRESS NOTES
[facility]  GI SERVICE PROGRESS NOTE    Mario Doran Patient Status:  Inpatient    1985 MRN [id number]   Location [facility] 1W Attending Melissa Ojeda MD   Hosp Day # 2 PCP BRAD RO MD       Subjective:     Looks well and feels well  Some abdominal tenderness but overall much better than prior to admission    Wants to eat but also wants to complete workup.    Afebrile overnight; HR 70s-90s    Objective:   Blood pressure 129/75, pulse 84, temperature 98.5 °F (36.9 °C), temperature source Oral, resp. rate 20, height 5' 7\" (1.702 m), weight 291 lb 3.2 oz (132.1 kg), SpO2 96%.    GENERAL: Pleasant outgoing no distress  HEENT: Normocephalic; pupils equally round and reactive to light; no temporal wasting; no thyromegaly or cervical lymphadenopathy  PULM: Breathing and speaking comfortably  ABD: Normoactive bowel sounds; soft/nondistended  EXT: No edema  SKIN: No rash        Results:       Recent Labs   Lab 02/15/24  1403 24  0658 24  0636   RBC 5.19 4.62 4.50   HGB 14.4 12.8* 13.0   HCT 43.2 39.3 38.7*   MCV 83.2 85.1 86.0   MCH 27.7 27.7 28.9   MCHC 33.3 32.6 33.6   RDW 13.3 13.6 13.3   NEPRELIM 11.79* 10.13*  --    WBC 15.0* 14.0* 12.3*   .0 213.0 211.0       No results found for: \"PT\", \"INR\"    Recent Labs   Lab 02/15/24  1403 24  0658 24  0636   GLU 97 113* 103*   BUN 7* 6* <5*   CREATSERUM 0.89 0.82 0.93   CA 9.2 8.5* 9.0   ALB 4.3  --   --    * 138 139   K 3.8 3.8 3.8    105 104   CO2 28.0 29.0 30.0   ALKPHO 75  --   --    AST 28  --   --    ALT 45  --   --    BILT 0.6  --   --    TP 7.7  --   --        Recent Labs   Lab 02/15/24  1403   LIP 35       No results for input(s): \"ESRML\", \"ESRPF\", \"CRP\", \"MG\", \"PHOS\", \"TROP\", \"B12\" in the last 168 hours.    No results for input(s): \"URINE\", \"CULTI\", \"BLDSMR\" in the last 168 hours.      CT ABDOMEN+PELVIS(CONTRAST ONLY)(CPT=74177)    Result Date: 2/15/2024  CONCLUSION:   Inflammatory  fat stranding within the medial right upper quadrant adjacent to the pancreaticoduodenal groove.  Findings may be secondary to groove pancreatitis or duodenitis of the 2nd and 3rd portion of the duodenum.  No mass or abscess.  Hepatic steatosis  Multiple other incidental findings as described in the body of the report.      Dictated by (CST): Meir Rollins MD on 2/15/2024 at 3:58 PM     Finalized by (CST): Meir Rollins MD on 2/15/2024 at 4:15 PM             Assessment and Plan:     38yr old man with BMI 45, hospitalized through ED 2/15/2024 for workup of 4 day history epigastric pain which had become severe.    Labs about 4 days into this syndrome showed normal LFTs and serum lipase.     s/p EGD examination 2/16/2024 shows mild erosive gastritis only; biopsies pending    Brother with history cholecystectomy    Clinically this appears to be mild acute pancreatitis by history and CT imaging with reassuring LFTs and normal serum Iipase on arrival.  Normal gallbladder wall and biliary tree on CT scan.  No history of alcohol binge prior to onset of symptoms.    If this was pancreatitis, likely biliary/gallstone/gallbladder sludge in origin.    Suggest:    Check GB/biliary ultrasound exam  Advance diet; okay to discharge today before ultrasound resulted  Consideration for elective cholecystectomy if cholelithiasis or gallbladder sludge on ultrasound.        Jin Rogers MD            Over 25 minutes spent today reviewing today's and recent data; greater than 50% of that time was spent counseling the patient and coordination of care with RN and other involved physicians.    Digital transcription software was utilized to produce this note. The note was proofread for content only. Typographical errors may remain.

## 2024-02-19 ENCOUNTER — PATIENT OUTREACH (OUTPATIENT)
Dept: CASE MANAGEMENT | Age: 39
End: 2024-02-19

## 2024-02-19 DIAGNOSIS — K29.80 DUODENITIS: Primary | ICD-10-CM

## 2024-02-19 NOTE — PROGRESS NOTES
Hi, seen inpatient. Please let him know H pylori is positive and to start treatment with quad therapy. Needs f/u in office and eradication testing. Thanks!

## 2024-02-19 NOTE — PROGRESS NOTES
Initial Post Discharge Follow Up   Discharge Date: 2/17/24  Contact Date: 2/19/2024    Consent Verification:  Assessment Completed With: Patient  HIPAA Verified?  Yes    Discharge Dx:   Duodenitis  02/16/24 Procedure: EGD w/biopsy    General:   How have you been since your discharge from the hospital? Pt feeling better, since hospital discharge--stomach discomfort has subsided, tolerating bland diet, staying hydrated, independent with ADLs. Pt unsure if he will  and start Pantoprazole. \"I want to see how I feel, first. If I need it, I will definitely pick it up.\" Patient denies fever, chills, nausea, vomiting, diarrhea, intractable abdominal pain, chest pain or shortness of breath at this time.  Do you have any pain since discharge?  No    How well was your pain managed while in the hospital?   On a scale of 1-5   1- Very Poor and 5- Very well   Very Well  When you were leaving the hospital were your discharge instructions reviewed with you? Yes  How well were your discharge instructions explained to you?   On a scale of 1-5   1- Very Poor and 5- Very well   Very Well  Do you have any questions about your discharge instructions?  No  Before leaving the hospital was your diagnoses explained to you? Yes  Do you have any questions about your diagnoses? No  Are you able to perform normal daily activities of living as you have prior to your hospital stay (dressing, bathing, ambulating to the bathroom, etc)? yes  (NCM) Was patient given a different diet per AVS? yes  If so, which diet?  [place] diet  Are there any barriers to following this diet? no    Medications:   Current Outpatient Medications   Medication Sig Dispense Refill    pantoprazole 40 MG Oral Tab EC Take 1 tablet (40 mg total) by mouth daily. 30 tablet 0    fluticasone propionate 50 MCG/ACT Nasal Suspension 2 sprays by Nasal route daily. 16 g 0    cetirizine 10 MG Oral Tab Take 1 tablet (10 mg total) by mouth daily. 30 tablet 0    ergocalciferol 1.25 MG  (34071 UT) Oral Cap Take by mouth every 7 days.       Were there any changes to your current medication(s) noted on the AVS? Yes  START taking:  pantoprazole (Protonix)  CHANGE how you take:  cetirizine (ZyrTEC)  fluticasone propionate (Flonase)  If so, were these medication changes discussed with you prior to leaving the hospital? Yes  If a new medication was prescribed:    Was the new medication's purpose & side effects reviewed? Yes  Do you have any questions about your new medication? No  Did you  your discharge medications when you left the hospital?  Pt unsure if he will  or not  Let's go over your medications together to make sure we are not missing anything. Medications Reviewed  Are there any reasons that keep you from taking your medication as prescribed? No  Are you having any concerns with constipation? No  Did patient receive their flu shot (Sept-March)? No    Discharge medications reviewed/discussed/and reconciled against outpatient medications with patient.  Any changes or updates to medications sent to PCP.  Patient Acknowledged     Referrals/orders at D/C:  Referrals/orders placed at D/C? no  DME ordered at D/C? No    Discharge orders, AVS reviewed and discussed with patient. Any changes or updates to orders sent to PCP.  Patient Acknowledged      SDOH:   Transportation:    Transportation Needs: No Transportation Needs (2/19/2024)    Transportation Needs     Lack of Transportation: No     Financial Strain:   Financial Resource Strain: Low Risk  (2/19/2024)    Financial Resource Strain     Difficulty of Paying Living Expenses: Not very hard     Med Affordability: No       Follow up appointments:    Follow up With Specialties Details Why Contact Info   Germán Ramos MD Family Medicine Follow up in 1 week(s)  [address]  [facility] [address]  [phone]   Sydnee Bedoya MD GASTROENTEROLOGY Follow up As needed [address] [facility] [id number] [phone]      TCC  Was TCC ordered: No  Was TCC scheduled: Yes  If yes: [x]  Advised patient to bring all medications and blood glucose meter/supplies if applicable.    Specialist    Does the patient have any other follow up appointment(s) needing to be scheduled? Yes  If yes: NCM reviewed upcoming specialist appointment with patient: Yes  Does the patient need assistance scheduling appointment(s): No--pt will call GI, as needed--declines appt assist    Is there any reason as to why you cannot make your appointment(s)?  Does not currently have insurance     Needs post D/C:   Now that you are home, are there any needs or concerns you need addressed before your next visit with your PCP?  (DME, meds, questions, etc.): No    Interventions by NCM:   Discussed diet, activity, medications and need for f/u visits. Pt confirms he does not currently have insurance--declines f/u appt with Dr. Ramos (last office visit 9/01/2021). Pt will call GI, as needed and will work on getting health insurance and new PCP. In office non-TCM TCC appt made for 2/22/2024 with JESUS Hartman--relayed TCC address and ph # to pt--pt declines sooner appt d/t work schedule. Patient aware when to contact specialists and when to seek emergency care. No further questions/concerns at this time.    Overall Rating:   How would you rate the care you received while in the hospital? excellent    CCM referral placed:    Not Applicable    BOOK BY DATE: 3/02/2024

## 2024-02-20 ENCOUNTER — TELEPHONE (OUTPATIENT)
Facility: CLINIC | Age: 39
End: 2024-02-20

## 2024-02-20 NOTE — TELEPHONE ENCOUNTER
Called and left message for patient to review biopsy results.     Nursing: If patient calls back please get a good call back time.     Thanks!    Zunilda

## 2024-02-20 NOTE — TELEPHONE ENCOUNTER
----- Message from Sydnee James Ma, MD sent at 2/19/2024  2:22 PM CST -----  Hi, seen inpatient. Please let him know H pylori is positive and to start treatment with quad therapy. Needs f/u in office and eradication testing. Thanks!

## 2024-02-22 ENCOUNTER — TELEPHONE (OUTPATIENT)
Facility: CLINIC | Age: 39
End: 2024-02-22

## 2024-02-22 NOTE — TELEPHONE ENCOUNTER
Called and was unable to leave message for patient in regards to biopsies.       Isa: I believe you are seeing the patient today. If you feel comfortably, please let him know he tested positive for h pylori and advise he give our office a call back to discuss treatment. I recommend quad therapy.     Zunilda Jamil PA-C

## 2024-02-22 NOTE — TELEPHONE ENCOUNTER
Spoke with Mario and attempted to reschedule. He stressed he was feeling better and did not see the need for the appointment. We explained the benefits of the TCC and told patient to call if symptoms worsened within the next week.  Relayed to patient the importance of returning the call to the GI office due to test results. Patient agreed and confirmed telephone number.

## 2024-02-22 NOTE — TELEPHONE ENCOUNTER
I most certainly will but he is already 4 minutes late for his appointment. So if he shows up we will let him know and you want quad therapy sent in correct and then have him contact your office?    Isa FLORES

## 2024-02-22 NOTE — TELEPHONE ENCOUNTER
Zunilda    Patient is returning your call  He was a no show for his appointment with Isa CANALES today.    Did you want to speak to him or did you want to give me a message to relay?    Thank you

## 2024-02-22 NOTE — TELEPHONE ENCOUNTER
He has not showed up as of yet. I'm thinking he is a chronic no show person who doesn't answer his phone either.

## 2024-02-22 NOTE — TELEPHONE ENCOUNTER
Hi yes please. It seems like he may not show for you as well.     I can call him again this week too!    Zunilda

## 2024-02-27 RX ORDER — TETRACYCLINE HYDROCHLORIDE 500 MG/1
500 CAPSULE ORAL 4 TIMES DAILY
Qty: 56 CAPSULE | Refills: 0 | Status: SHIPPED | OUTPATIENT
Start: 2024-02-27 | End: 2024-03-12

## 2024-02-27 RX ORDER — BISMUTH SUBSALICYLATE 262 MG/1
2 TABLET, CHEWABLE ORAL 4 TIMES DAILY
Qty: 112 TABLET | Refills: 0 | Status: SHIPPED | OUTPATIENT
Start: 2024-02-27 | End: 2024-03-12

## 2024-02-27 RX ORDER — METRONIDAZOLE 250 MG/1
250 TABLET ORAL 4 TIMES DAILY
Qty: 56 TABLET | Refills: 0 | Status: SHIPPED | OUTPATIENT
Start: 2024-02-27 | End: 2024-03-12

## 2024-02-27 RX ORDER — OMEPRAZOLE 20 MG/1
20 CAPSULE, DELAYED RELEASE ORAL
Qty: 28 CAPSULE | Refills: 0 | Status: SHIPPED | OUTPATIENT
Start: 2024-02-27 | End: 2024-03-12

## 2024-02-27 NOTE — TELEPHONE ENCOUNTER
Called and talked with patient.     Reviewed results. Patient positive for h pylori. Will plan for quad therapy at this time. Reviewed medication instructions with patient. He notes he does not have insurance at this time. Advised patient to see how expensive the treatment was and if unobtainable, can discuss alternate therapy.     Zunilda Jamil PA-C

## 2024-02-27 NOTE — TELEPHONE ENCOUNTER
Patient contacted and f/u appointment made for 04/15/2024 at 9:00 am with Zunilda Jamil at [facility].  Patient advised to arrive 15 minutes prior to appointment and address given.  Patient voiced understanding,

## 2024-03-04 ENCOUNTER — MED REC SCAN ONLY (OUTPATIENT)
Facility: CLINIC | Age: 39
End: 2024-03-04

## (undated) DEVICE — KIT ENDO ORCAPOD 160/180/190

## (undated) DEVICE — CANNULA NASAL ADULT PIGTAIL L7

## (undated) DEVICE — TUBING SCT CLR 6FT .25IN MDVC

## (undated) DEVICE — SYRINGE REGULAR TIP 60ML

## (undated) DEVICE — FORCEPS BX L240CM DIA2.4MM L NDL RAD JAW 4

## (undated) DEVICE — TUBE SUCT STD TRNSPAR RIG W/ BLB TIP RIB 5IN1

## (undated) DEVICE — TUBING SUCT L12FT DIA6MM CLR N CNDTVE W/ MAXI

## (undated) DEVICE — KIT CLEAN ENDOKIT 1.1OZ GOWNX2

## (undated) DEVICE — BLOCK BITE LG LUMN 20X27MM GRN DISP

## (undated) NOTE — LETTER
[place] [address]  Authorization for Invasive Procedures  Date: 02/16/2024           Time: 0931    I hereby authorize Sydnee Bedoya MD , my physician and his/her assistants (if applicable), which may include medical students, residents, and/or fellows, to perform the following surgical operation/ procedure and administer such anesthesia as may be determined necessary by my physician:  esophagogastroduodenoscopy on Mario Doran  2.   I recognize that during the surgical operation/procedure, unforeseen conditions may necessitate additional or different procedures than those listed above.  I, therefore, further authorize and request that the above-named surgeon, assistants, or designees perform such procedures as are, in their judgment, necessary and desirable.    3.   My surgeon/physician has discussed prior to my surgery the potential benefits, risks and side effects of this procedure; the likelihood of achieving goals; and potential problems that might occur during recuperation.  They also discussed reasonable alternatives to the procedure, including risks, benefits, and side effects related to the alternatives and risks related to not receiving this procedure.  I have had all my questions answered and I acknowledge that no guarantee has been made as to the result that may be obtained.    4.   Should the need arise during my operation/procedure, which includes change of level of care prior to discharge, I also consent to the administration of blood and/or blood products.  Further, I understand that despite careful testing and screening of blood or blood products by collecting agencies, I may still be subject to ill effects as a result of receiving a blood transfusion and/or blood products.  The following are some, but not all, of the potential risks that can occur: fever and allergic reactions, hemolytic reactions, transmission of diseases such as Hepatitis, AIDS and Cytomegalovirus  (CMV) and fluid overload.  In the event that I wish to have an autologous transfusion of my own blood, or a directed donor transfusion, I will discuss this with my physician.   Check only if Refusing Blood or Blood Products  I understand refusal of blood or blood products as deemed necessary by my physician may have serious consequences to my condition to include possible death. I hereby assume responsibility for my refusal and release the hospital, its personnel, and my physicians from any responsibility for the consequences of my refusal.         o  Refuse         5.   I authorize the use of any specimen, organs, tissues, body parts or foreign objects that may be removed from my body during the operation/procedure for diagnosis, research or teaching purposes and their subsequent disposal by hospital authorities.  I also authorize the release of specimen test results and/or written reports to my treating physician on the hospital medical staff or other referring or consulting physicians involved in my care, at the discretion of the Pathologist or my treating physician.    6.   I consent to the photographing or videotaping of the operations or procedures to be performed, including appropriate portions of my body for medical, scientific, or educational purposes, provided my identity is not revealed by the pictures or by descriptive texts accompanying them.  If the procedure has been photographed/videotaped, the surgeon will obtain the original picture, image, videotape or CD.  The hospital will not be responsible for storage, release or maintenance of the picture, image, tape or CD.    7.   I consent to the presence of a  or observers in the operating room as deemed necessary by my physician or their designees.    8.   I recognize that in the event my procedure results in extended X-Ray/fluoroscopy time, I may develop a skin reaction.    9. If I have a Do Not Attempt Resuscitation (DNAR) order in  place, that status will be suspended while in the operating room, procedural suite, and during the recovery period unless otherwise explicitly stated by me (or a person authorized to consent on my behalf). The surgeon or my attending physician will determine when the applicable recovery period ends for purposes of reinstating the DNAR order.  10. Patients having a sterilization procedure: I understand that if the procedure is successful the results will be permanent and it will therefore be impossible for me to inseminate, conceive, or bear children.  I also understand that the procedure is intended to result in sterility, although the result has not been guaranteed.   11. I acknowledge that my physician has explained sedation/analgesia administration to me including the risk and benefits I consent to the administration of sedation/analgesia as may be necessary or desirable in the judgment of my physician.    I CERTIFY THAT I HAVE READ AND FULLY UNDERSTAND THE ABOVE CONSENT TO OPERATION and/or OTHER PROCEDURE.        ____________________________________       _________________________________      ______________________________  Signature of Patient         Signature of Responsible Person        Printed Name of Responsible Person        ____________________________________      _________________________________      ______________________________       Signature of Witness          Relationship to Patient                       Date                                       Time    Patient Name: Mario Doran     : 1985                 Printed: 2024      Medical Record #: [id number]                      Page 1 of 2          STATEMENT OF PHYSICIAN My signature below affirms that prior to the time of the procedure; I have explained to the patient and/or his/her legal representative, the risks and benefits involved in the proposed treatment and any reasonable alternative to the proposed treatment. I  have also explained the risks and benefits involved in refusal of the proposed treatment and alternatives to the proposed treatment and have answered the patient's questions. If I have a significant financial interest in a co-management agreement or a significant financial interest in any product or implant, or other significant relationship used in this procedure/surgery, I have disclosed this and had a discussion with my patient.     _______________________________________________________________ _____________________________  (Signature of Physician)                                                                                         (Date)                                   (Time)    Patient Name: Mario Doran     : 1985                 Printed: 2024      Medical Record #: [id number]                      Page 2 of 2

## (undated) NOTE — LETTER
Date & Time: 10/20/2023, 12:05 PM  Patient: Lynn Don  Encounter Provider(s):    PARKER Kitchen       To Whom It May Concern:    Lynn Don was seen and treated in our department on 10/20/2023. He should not return to work until 10/23/2023 . If you have any questions or concerns, please do not hesitate to call.     MARK Mccarty    _____________________________  ITIJARYQO/CTS Signature

## (undated) NOTE — LETTER
[facility]  Administration of Anesthesia  I Mario Doran agree to be cared for by a physician anesthesiologist alone and/or with a nurse anesthetist, who is specially trained to monitor me and give me medicine to put me to sleep or keep me comfortable during my procedure    I understand that my anesthesiologist and/or anesthetist is not an employee or agent of [facility] or [employer]. He or she works for [facility]    As the patient asking for anesthesia services, I agree to:  Allow the anesthesiologist (anesthesia doctor) to give me medicine and do additional procedures as necessary. Some examples are: Starting or using an “IV” to give me medicine, fluids or blood during my procedure, and having a breathing tube placed to help me breathe when I’m asleep (intubation). In the event that my heart stops working properly, I understand that my anesthesiologist will make every effort to sustain my life, unless otherwise directed by [facility] Do Not Resuscitate documents.  Tell my anesthesia doctor before my procedure:  If I am pregnant.  The last time that I ate or drank.  iii. All of the medicines I take (including prescriptions, herbal supplements, and pills I can buy without a prescription (including street drugs/illegal medications). Failure to inform my anesthesiologist about these medicines may increase my risk of anesthetic complications.  iv.If I am allergic to anything or have had a reaction to anesthesia before.  I understand how the anesthesia medicine will help me (benefits).  I understand that with any type of anesthesia medicine there are risks:  The most common risks are: nausea, vomiting, sore throat, muscle soreness, damage to my eyes, mouth, or teeth (from breathing tube placement).  Rare risks include: remembering what happened during my procedure, allergic reactions to medications, injury to my airway, heart, lungs, vision, nerves, or  muscles and in extremely rare instances death.  My doctor has explained to me other choices available to me for my care (alternatives).  Pregnant Patients (“epidural”):  I understand that the risks of having an epidural (medicine given into my back to help control pain during labor), include itching, low blood pressure, difficulty urinating, headache or slowing of the baby’s heart. Very rare risks include infection, bleeding, seizure, irregular heart rhythms and nerve injury.  Regional Anesthesia (“spinal”, “epidural”, & “nerve blocks”):  I understand that rare but potential complications include headache, bleeding, infection, seizure, irregular heart rhythms, and nerve injury.    _____________________________________________________________________________  Patient (or Representative) Signature/Relationship to Patient  Date   Time    _____________________________________________________________________________   Name (if used)    Language/Organization   Time    _____________________________________________________________________________  Nurse Anesthetist Signature     Date   Time  _____________________________________________________________________________  Anesthesiologist Signature     Date   Time  I have discussed the procedure and information above with the patient (or patient’s representative) and answered their questions. The patient or their representative has agreed to have anesthesia services.    _____________________________________________________________________________  Witness        Date   Time  I have verified that the signature is that of the patient or patient’s representative, and that it was signed before the procedure  Patient Name: Mario Doran     : 1985                 Printed: 2024 at 8:55 AM    Medical Record #: [id number]                                            Page 1 of 1  ----------ANESTHESIA CONSENT----------

## (undated) NOTE — Clinical Note
Spoke with pt today for HFU. In office non-TCM TCC appt made for 2/22/2024--pt declines sooner appt--thank you.  Future Appointments 2/22/2024  3:00 PM    Isa Hartman APRN     TCC                 D[facility] Medic